# Patient Record
Sex: MALE | Race: WHITE
[De-identification: names, ages, dates, MRNs, and addresses within clinical notes are randomized per-mention and may not be internally consistent; named-entity substitution may affect disease eponyms.]

---

## 2017-02-21 ENCOUNTER — HOSPITAL ENCOUNTER (EMERGENCY)
Dept: HOSPITAL 45 - C.EDB | Age: 72
Discharge: HOME | End: 2017-02-21
Payer: COMMERCIAL

## 2017-02-21 VITALS — SYSTOLIC BLOOD PRESSURE: 149 MMHG | HEART RATE: 81 BPM | OXYGEN SATURATION: 96 % | DIASTOLIC BLOOD PRESSURE: 88 MMHG

## 2017-02-21 VITALS
HEIGHT: 72.01 IN | WEIGHT: 174.17 LBS | WEIGHT: 174.17 LBS | BODY MASS INDEX: 23.59 KG/M2 | BODY MASS INDEX: 23.59 KG/M2 | HEIGHT: 72.01 IN

## 2017-02-21 VITALS — TEMPERATURE: 97.88 F

## 2017-02-21 DIAGNOSIS — L03.313: ICD-10-CM

## 2017-02-21 DIAGNOSIS — I10: ICD-10-CM

## 2017-02-21 DIAGNOSIS — Z83.3: ICD-10-CM

## 2017-02-21 DIAGNOSIS — Z79.899: ICD-10-CM

## 2017-02-21 DIAGNOSIS — Z79.82: ICD-10-CM

## 2017-02-21 DIAGNOSIS — L02.213: Primary | ICD-10-CM

## 2017-02-21 DIAGNOSIS — E11.9: ICD-10-CM

## 2017-02-21 LAB
ANION GAP SERPL CALC-SCNC: 18 MMOL/L (ref 16–25)
CA-I BLD-SCNC: 1.19 MMOL/L (ref 1.12–1.32)
CHLORIDE BLD-SCNC: 104 MEQ/L (ref 101–112)
CREAT BLD-MCNC: 1.6 MG/DL (ref 0.6–1.3)
HCT VFR BLD AUTO: 36 % (ref 42–52)
HGB BLD-MCNC: 12.2 G/DL (ref 14–18)
ISTAT CARBON DIOXIDE: 24 MEQ/L (ref 24–31)
SODIUM BLD-SCNC: 140 MEQ/L (ref 135–144)

## 2017-02-21 NOTE — EMERGENCY ROOM VISIT NOTE
ED Visit Note


The patient has been thoroughly evaluated by Dr. Whitaker, who asked me to 

drain the potential abscess on the patient's anterior chest.  Please refer to 

his documentation regarding the patient's visit.  I examined the patient.  

Verbal consent was obtained to perform the procedure.  After saline and 

Betadine cleansing and 1.5 mL of 1% buffered lidocaine with epinephrine 

anesthesia, the abscess was incised with a number 11 scalpel blade.  A large 

amount of purulent material was released with more expressed by pressure.  A 

swab was obtained for culture.  The abscess cavity was further probed with 

blunt scissors and the deep pocket expressed.  The area was cleaned with 

sterile saline and dressed with a bulky bandage.  The patient tolerated the 

procedure well.

## 2017-02-21 NOTE — EMERGENCY ROOM VISIT NOTE
History


Report prepared by Lazaro:  Saleem Taylor


Under the Supervision of:  Dr. Parker Whitaker M.D.


First contact with patient:  16:15


Chief Complaint:  REFERRED BY DOCTOR


Stated Complaint:  HIGH POTASSIUM





History of Present Illness


The patient is a 71 year old male who presents to the Emergency Room after 

being referred for high potassium prior to arrival. The patient was getting 

blood-work for diabetes when his labs showed his potassium was 6. The patient 

denies any history of high potassium. He notes that right now he feels fine. 

The last time his sugar was checked, the patient notes that it was high. The 

patient also notes a bump on his chest that has some drainage. He had this area 

drained before, however, it came back. He denies nausea, vomiting, fevers, 

diarrhea, leg swelling, or any other medical complaints at this time.





   Source of History:  patient


   Onset:  prior to arrival


   Position:  other (global)


   Associated Symptoms:  No diarrhea, No fevers, No nausea, No vomiting


Note:


Associated symptoms: high sugar, bump with drainage on chest


Denies: leg swelling





Review of Systems


See HPI for pertinent positives & negatives. A total of 10 systems reviewed and 

were otherwise negative.





Past Medical & Surgical


Medical Problems:


(1) Benign hypertension


(2) Cellulitis of leg


(3) Diabetes mellitus type 2








Family History





Diabetes mellitus





Social History


Smoking Status:  Never Smoker


Alcohol Use:  none


Drug Use:  none


Marital Status:  


Housing Status:  lives alone


Occupation Status:  retired





Current/Historical Medications


Scheduled


Aspirin Enteric Coated (Ecotrin Or Generic), 81 MG PO DAILY


Atorvastatin (Lipitor), 10 MG PO DAILY


Cephalexin Monohydrate (Keflex), 500 MG PO TID


Gabapentin (Neurontin), 100 MG PO TID


Glipizide Xl (Glucotrol Xl), 10 MG PO QAM


Lisinopril (Prinivil), 20 MG PO DAILY


Sulfamethoxazole-Trimethoprim (Bactrim Ds 800MG/160MG), 1 TAB PO BID





Allergies


Coded Allergies:  


     No Known Allergies (Unverified , 2/21/17)





Physical Exam


Vital Signs











  Date Time  Temp Pulse Resp B/P Pulse Ox O2 Delivery O2 Flow Rate FiO2


 


2/21/17 18:50  81 18 149/88 96   


 


2/21/17 18:00  83 18 135/78 94 Room Air  


 


2/21/17 16:11 36.6 94 20 113/68 96   











Physical Exam


GENERAL: Patient is chronically unwell appearing in no distress. 


HEENT: No acute trauma, normocephalic atraumatic, mucous membranes moist, no 

nasal congestion. Cloudy right cornea, reactive left pupil. 


NECK: No stridor, no adenopathy, no meningismus, trachea is midline.


LUNGS: No dyspnea. Clear to auscultation and equal bilaterally. No wheeze, no 

rhonchi.


CHEST: 2 cm draining cyst in right mid chest with surrounding erythema. 


HEART: Regular rate and rhythm.  No murmurs, rubs, gallops appreciated.


ABDOMEN: Soft, nontender, bowel sounds positive, no masses appreciated, no 

peritonitis.


BACK: No midline tenderness, no CVA tenderness


EXTREMITIES: Normal motion all extremities, no cyanosis, no edema. Decreased 

sensation in feet, states chronic. 


NEUROLOGIC: Alert and oriented, no acute motor or sensory deficits, no focal 

weakness, cranial nerves grossly intact.


SKIN: No rash, no jaundice, no diaphoresis.





Medical Decision & Procedures


Laboratory Results











Test


  2/21/17


16:58


 


Bedside Hemoglobin


  12.2 g/dl


(14.0-18.0)


 


Bedside Hematocrit 36 % (42-52) 


 


Bedside Sodium


  140 mEq/L


(135-144)


 


Bedside Potassium


  5.3 mEq/L


(3.3-5.0)


 


Bedside Chloride


  104 mEq/L


(101-112)


 


Bedside Total CO2


  24 mEq/l


(24-31)


 


Anion Gap


  18.0 mmol/L


(16-25)


 


Bedside Blood Urea Nitrogen


  30 mg/dl


(7-18)


 


Bedside Creatinine


  1.6 mg/dl


(0.6-1.3)


 


Bedside Glucose (other)


  164 mg/dl


(70-99)


 


Bedside Ionized Calcium (Kina)


  1.19 mmol/l


(1.12-1.32)





Laboratory results as reviewed by me.





Medications Administered











 Medications


  (Trade)  Dose


 Ordered  Sig/Erika


 Route  Start Time


 Stop Time Status Last Admin


Dose Admin


 


 Cephalexin


 Monohydrate


  (Keflex Cap)  500 mg  NOW  ONCE


 PO  2/21/17 18:15


 2/21/17 18:16 DC 2/21/17 18:32


500 MG











ED Course


1620: The patient was evaluated in room C4. A complete history and physical 

exam was performed.





1630: Ordered Lidocaine/ Epinephrine 20 ml INFIL. 








1727: At this time, I reevaluated the patient and he feels good. He agrees to 

recheck his potassium levels with his PCP in a few days. He is waiting to get 

his chest cyst drained and then he is ready to go home. 





1802: At this time, Roddy Gill PA-C - [facility] performed an IND 

procedure on the patient. See his note for details. 





1815: Ordered Cephalexin Monohydrate 500 mg PO. 





1830: Reevaluated the patient. Discussed results and discharge instructions: He 

verbalized understanding and agreement.   The patient is ready for discharge.





Medical Decision


Differential: Sepsis, Infectious (UTI/Pneumonia/Meningitis/etc), Metabolic/

Electrolyte Abnormality, Cardiac, Hepatic, Endocrine, Toxicologic, Neurologic, 

amongst other pathologies entertained.





71 yr old male arrives for evaluation of elevated K at outpatient lab.  Bedside 

iStat has K of 5.3.  He has no symptoms hyperK and Cr OK.  I suspect that there 

is hemolysis going one.  Patient feels well and wishes to go home.  Seems 

reasonable to have recheck by PCP in next few days.  Did have abscess on chest 

which was drained and as there is mild cellulitis will start Keflex while 

awaiting culture.  No history of MRSA.  He is in no distress and feels well.  

Discussed symptoms requiring RTED.





Impression





 Primary Impression:  


 Routine lab draw


 Additional Impression:  


 Cutaneous abscess of chest wall





Scribe Attestation


The scribe's documentation has been prepared under my direction and personally 

reviewed by me in its entirety. I confirm that the note above accurately 

reflects all work, treatment, procedures, and medical decision making performed 

by me.





Departure Information


Dispostion


Home / Self-Care





Prescriptions





Cephalexin Monohydrate (Keflex) 500 Mg Cap


500 MG PO TID for 5 Days, #15 CAP


   Prov: Parker Whitaker M.D.         2/21/17





Referrals


Stephanie Covarrubias D.O. (PCP)





Forms


HOME CARE DOCUMENTATION FORM,                                                 

               IMPORTANT VISIT INFORMATION, WORK / SCHOOL INSTRUCTIONS





Patient Instructions


My [facility]





Additional Instructions





Please contact your Primary Provider to have a repeat Potassium check in the 

next few days.   You potassium was OK today but you must have it rechecked.





Return if chest pain, passing out, weakness or other concerns.


Monitor wound for worsening infection.  Return if fevers, redness, elevated 

blood sugars.





Problem Qualifiers

## 2017-09-11 ENCOUNTER — HOSPITAL ENCOUNTER (OUTPATIENT)
Dept: HOSPITAL 45 - X.SURG | Age: 72
Discharge: ANOTHER HOSPITAL | End: 2017-09-11
Attending: OPHTHALMOLOGY
Payer: COMMERCIAL

## 2017-09-11 ENCOUNTER — HOSPITAL ENCOUNTER (INPATIENT)
Dept: HOSPITAL 45 - C.EDC | Age: 72
LOS: 5 days | Discharge: NURSING, REHAB OR LONG-TERM CARE | DRG: 638 | End: 2017-09-16
Attending: HOSPITALIST | Admitting: HOSPITALIST
Payer: COMMERCIAL

## 2017-09-11 VITALS
HEART RATE: 99 BPM | TEMPERATURE: 98.06 F | OXYGEN SATURATION: 98 % | SYSTOLIC BLOOD PRESSURE: 91 MMHG | DIASTOLIC BLOOD PRESSURE: 57 MMHG

## 2017-09-11 VITALS
HEIGHT: 72.01 IN | WEIGHT: 170.35 LBS | WEIGHT: 170.35 LBS | BODY MASS INDEX: 23.07 KG/M2 | HEIGHT: 72.01 IN | BODY MASS INDEX: 23.07 KG/M2

## 2017-09-11 VITALS
OXYGEN SATURATION: 94 % | SYSTOLIC BLOOD PRESSURE: 113 MMHG | DIASTOLIC BLOOD PRESSURE: 73 MMHG | TEMPERATURE: 97.88 F | HEART RATE: 85 BPM

## 2017-09-11 VITALS
BODY MASS INDEX: 21.62 KG/M2 | HEIGHT: 72.01 IN | BODY MASS INDEX: 21.62 KG/M2 | WEIGHT: 159.61 LBS | WEIGHT: 159.61 LBS | BODY MASS INDEX: 21.62 KG/M2 | HEIGHT: 72.01 IN

## 2017-09-11 VITALS
HEART RATE: 82 BPM | SYSTOLIC BLOOD PRESSURE: 104 MMHG | DIASTOLIC BLOOD PRESSURE: 70 MMHG | TEMPERATURE: 97.88 F | OXYGEN SATURATION: 98 %

## 2017-09-11 DIAGNOSIS — E11.65: ICD-10-CM

## 2017-09-11 DIAGNOSIS — N40.1: ICD-10-CM

## 2017-09-11 DIAGNOSIS — R33.9: ICD-10-CM

## 2017-09-11 DIAGNOSIS — Z91.14: ICD-10-CM

## 2017-09-11 DIAGNOSIS — I10: ICD-10-CM

## 2017-09-11 DIAGNOSIS — E78.5: ICD-10-CM

## 2017-09-11 DIAGNOSIS — H18.891: Primary | ICD-10-CM

## 2017-09-11 DIAGNOSIS — H18.891: ICD-10-CM

## 2017-09-11 DIAGNOSIS — Z79.899: ICD-10-CM

## 2017-09-11 DIAGNOSIS — N18.3: ICD-10-CM

## 2017-09-11 DIAGNOSIS — D72.829: ICD-10-CM

## 2017-09-11 DIAGNOSIS — I95.1: ICD-10-CM

## 2017-09-11 DIAGNOSIS — Z83.3: ICD-10-CM

## 2017-09-11 DIAGNOSIS — R09.02: ICD-10-CM

## 2017-09-11 DIAGNOSIS — I12.9: ICD-10-CM

## 2017-09-11 DIAGNOSIS — D64.9: ICD-10-CM

## 2017-09-11 DIAGNOSIS — E11.65: Primary | ICD-10-CM

## 2017-09-11 DIAGNOSIS — N17.9: ICD-10-CM

## 2017-09-11 DIAGNOSIS — Z79.84: ICD-10-CM

## 2017-09-11 DIAGNOSIS — Z79.82: ICD-10-CM

## 2017-09-11 DIAGNOSIS — Z53.8: ICD-10-CM

## 2017-09-11 LAB
ALP SERPL-CCNC: 63 U/L (ref 45–117)
ALT SERPL-CCNC: 18 U/L (ref 12–78)
ANION GAP SERPL CALC-SCNC: 5 MMOL/L (ref 3–11)
ANION GAP SERPL CALC-SCNC: 7 MMOL/L (ref 3–11)
APPEARANCE UR: CLEAR
AST SERPL-CCNC: 9 U/L (ref 15–37)
B-OH-BUTYR SERPL-SCNC: 4.08 MG/DL (ref 0.2–2.81)
BASE EXCESS STD BLDV CALC-SCNC: 0.1 MEQ/L
BASOPHILS # BLD: 0.02 K/UL (ref 0–0.2)
BASOPHILS NFR BLD: 0.2 %
BILIRUB UR-MCNC: (no result) MG/DL
BUN SERPL-MCNC: 36 MG/DL (ref 7–18)
BUN SERPL-MCNC: 37 MG/DL (ref 7–18)
BUN/CREAT SERPL: 18.6 (ref 10–20)
BUN/CREAT SERPL: 20 (ref 10–20)
CALCIUM SERPL-MCNC: 8.8 MG/DL (ref 8.5–10.1)
CALCIUM SERPL-MCNC: 9.1 MG/DL (ref 8.5–10.1)
CHLORIDE SERPL-SCNC: 101 MMOL/L (ref 98–107)
CHLORIDE SERPL-SCNC: 108 MMOL/L (ref 98–107)
CO2 SERPL-SCNC: 25 MMOL/L (ref 21–32)
CO2 SERPL-SCNC: 28 MMOL/L (ref 21–32)
COLOR UR: YELLOW
COMPLETE: YES
CREAT CL PREDICTED SERPL C-G-VRATE: 35.5 ML/MIN
CREAT CL PREDICTED SERPL C-G-VRATE: 39.4 ML/MIN
CREAT SERPL-MCNC: 1.8 MG/DL (ref 0.6–1.4)
CREAT SERPL-MCNC: 2 MG/DL (ref 0.6–1.4)
EOSINOPHIL NFR BLD AUTO: 196 K/UL (ref 130–400)
GLUCOSE SERPL-MCNC: 117 MG/DL (ref 70–99)
GLUCOSE SERPL-MCNC: 542 MG/DL (ref 70–99)
HCT VFR BLD CALC: 33.1 % (ref 42–52)
IG%: 0.2 %
IMM GRANULOCYTES NFR BLD AUTO: 16.1 %
INR PPP: 0.9 (ref 0.9–1.1)
LYMPHOCYTES # BLD: 2.11 K/UL (ref 1.2–3.4)
MAGNESIUM SERPL-MCNC: 2.6 MG/DL (ref 1.8–2.4)
MAGNESIUM SERPL-MCNC: 2.6 MG/DL (ref 1.8–2.4)
MANUAL MICROSCOPIC REQUIRED?: NO
MCH RBC QN AUTO: 28.8 PG (ref 25–34)
MCHC RBC AUTO-ENTMCNC: 32.9 G/DL (ref 32–36)
MCV RBC AUTO: 87.3 FL (ref 80–100)
MONOCYTES NFR BLD: 4.7 %
NEUTROPHILS # BLD AUTO: 0.6 %
NEUTROPHILS NFR BLD AUTO: 78.2 %
NITRITE UR QL STRIP: (no result)
PARTIAL THROMBOPLASTIN RATIO: 0.9
PCO2 BLDV: 44 MMHG (ref 38–50)
PH UR STRIP: 5 [PH] (ref 4.5–7.5)
PHOSPHATE SERPL-MCNC: 3.2 MG/DL (ref 2.5–4.9)
PHOSPHATE SERPL-MCNC: 3.6 MG/DL (ref 2.5–4.9)
PMV BLD AUTO: 10.8 FL (ref 7.4–10.4)
PO2 BLDV: 32 MMHG
POTASSIUM SERPL-SCNC: 3.9 MMOL/L (ref 3.5–5.1)
POTASSIUM SERPL-SCNC: 5 MMOL/L (ref 3.5–5.1)
PROTHROMBIN TIME: 9.8 SECONDS (ref 9–12)
RBC # BLD AUTO: 3.79 M/UL (ref 4.7–6.1)
REVIEW REQ?: NO
SAO2 % BLDV FROM PO2: 60.5 %
SODIUM SERPL-SCNC: 133 MMOL/L (ref 136–145)
SODIUM SERPL-SCNC: 141 MMOL/L (ref 136–145)
SP GR UR STRIP: 1.03 (ref 1–1.03)
URINE BILL WITH OR WITHOUT MIC: (no result)
UROBILINOGEN UR-MCNC: (no result) MG/DL
WBC # BLD AUTO: 13.08 K/UL (ref 4.8–10.8)
ZZUR CULT IF INDIC CLEAN CATCH: NO

## 2017-09-11 RX ADMIN — SODIUM CHLORIDE SCH MLS/HR: 900 INJECTION, SOLUTION INTRAVENOUS at 19:34

## 2017-09-11 RX ADMIN — MOXIFLOXACIN HYDROCHLORIDE SCH DROP: 5 SOLUTION/ DROPS OPHTHALMIC at 11:11

## 2017-09-11 RX ADMIN — HEPARIN SODIUM SCH UNIT: 10000 INJECTION, SOLUTION INTRAVENOUS; SUBCUTANEOUS at 21:53

## 2017-09-11 RX ADMIN — MOXIFLOXACIN HYDROCHLORIDE SCH DROP: 5 SOLUTION/ DROPS OPHTHALMIC at 11:01

## 2017-09-11 RX ADMIN — MOXIFLOXACIN HYDROCHLORIDE SCH DROP: 5 SOLUTION/ DROPS OPHTHALMIC at 11:21

## 2017-09-11 RX ADMIN — INSULIN ASPART SCH UNITS: 100 INJECTION, SOLUTION INTRAVENOUS; SUBCUTANEOUS at 21:52

## 2017-09-11 NOTE — DIAGNOSTIC IMAGING REPORT
CHEST 2 VIEWS ROUTINE



CLINICAL HISTORY: cough, hypoxia dyspnea



COMPARISON STUDY:  10/1/2013



FINDINGS: Mild chronic interstitial prominence. No focal infiltrate. Diaphragms

smooth. Calcific angles are sharp. No evidence for cardiac enlargement. 



IMPRESSION:  Mild chronic change. No acute process. 











The above report was generated using voice recognition software.  It may contain

grammatical, syntax or spelling errors.









Electronically signed by:  John Mane M.D.

9/11/2017 4:54 PM



Dictated Date/Time:  9/11/2017 4:53 PM

## 2017-09-11 NOTE — EMERGENCY ROOM VISIT NOTE
History


Report prepared by Lazaro:  Yony Rangel


Under the Supervision of:  Dr. Brandon Reardon D.O.


First contact with patient:  12:35


Chief Complaint:  HYPERGLYCEMIA


Stated Complaint:  hyperglycemia


Nursing Triage Summary:  


pt to the ED from the surgery center by EMS, pt was to have a biopsy on the 


right eye today and prior to surgery they checked his BSG and it was high and 


586 so they sent him here





pt has no complaints and takes pills and stated that he took some pills this am 


but could not state what they were





 pt had LR hanging pta





History of Present Illness


The patient is a 72 year old male who presents to the Emergency Room with 

complaints of hyperglycemia that began recently. His blood sugar was 586 prior 

to arrival. The patient was supposed to have a biopsy of his right eye at the 

surgical center, when they noticed his blood sugar was elevated. They sent him 

here. He has been taking his blood sugar medication. His last bowel movement 

was 10 minutes ago and was normal. Pt denies headache, change in vision, fevers

, chest pain, shortness of breath, nausea, vomiting, diarrhea, pain with 

urination, and melena.





   Source of History:  patient


   Onset:  PTA


   Position:  other (global)


   Symptom Intensity:  Blood sugar of 586


   Quality:  other (Hyperglycemia)


   Timing:  constant


   Associated Symptoms:  No fevers, No headache, No chest pain, No SOB, No 

nausea, No vomiting, No melena, No diarrhea, No urinary symptoms


Note:


He has vision trouble in his right eye.





Review of Systems


See HPI for pertinent positives & negatives. A total of 10 systems reviewed and 

were otherwise negative.





Past Medical & Surgical


Medical Problems:


(1) DEANNE (acute kidney injury)


(2) Benign hypertension


(3) Cellulitis of leg


(4) CKD (chronic kidney disease), stage III


(5) Diabetes mellitus type 2


(6) GERD (gastroesophageal reflux disease)


(7) Hyperlipidemia


Surgical Problems:


(1) H/O skin graft


(2) S/P tonsillectomy








Family History





Diabetes mellitus





Social History


Smoking Status:  Never Smoker


Alcohol Use:  none


Drug Use:  none


Marital Status:  


Housing Status:  lives alone


Occupation Status:  retired





Current/Historical Medications


Scheduled


Aspirin Enteric Coated (Ecotrin Or Generic), 81 MG PO QAM


Atorvastatin (Lipitor), 10 MG PO DAILY


Gabapentin (Neurontin), 100 MG PO DAILY


Glipizide Xl (Glucotrol Xl), 10 MG PO QAM


Lisinopril (Zestril), 20 MG PO DAILY





Allergies


Coded Allergies:  


     No Known Allergies (Unverified , 9/11/17)





Physical Exam


Vital Signs











  Date Time  Temp Pulse Resp B/P (MAP) Pulse Ox O2 Delivery O2 Flow Rate FiO2


 


9/11/17 17:12  84 16 97/64 98   


 


9/11/17 16:11  89 18 134/83 98  2.0 


 


9/11/17 14:15  95 16 133/77 90   


 


9/11/17 12:38  81      


 


9/11/17 12:22 36.7 90 16 134/81 99   











Physical Exam


GENERAL: alert, sitting up in bed, chronically ill appearing, disheveled, well 

nourished, no distress, non-toxic 


EYE EXAM: Right pupil and cornea are opacified. 


OROPHARYNX: no exudate, no erythema, lips, buccal mucosa, and tongue normal and 

mucous membranes are moist


NECK: supple, no nuchal rigidity, no adenopathy, non-tender


LUNGS: Clear to auscultation. Normal chest wall mechanics


HEART: no murmurs, S1 normal and S2 normal 


ABDOMEN: abdomen soft, non-tender, normo-active bowel sounds, no masses, no 

rebound or guarding. 


BACK: Back is symmetrical on inspection and there is no deformity, no midline 

tenderness, no CVA tenderness. 


SKIN: no rashes and no bruising 


UPPER EXTREMITIES: upper extremities are grossly normal. 


LOWER EXTREMITIES: No pitting edema.


NEURO EXAM: Normal sensorium, cranial nerves II-XII grossly intact, normal 

speech,  no gross weakness of arms, no gross weakness of legs.





Medical Decision & Procedures


Laboratory Results


9/11/17 13:45








Red Blood Count 3.79, Mean Corpuscular Volume 87.3, Mean Corpuscular Hemoglobin 

28.8, Mean Corpuscular Hemoglobin Concent 32.9, Mean Platelet Volume 10.8, 

Neutrophils (%) (Auto) 78.2, Lymphocytes (%) (Auto) 16.1, Monocytes (%) (Auto) 

4.7, Eosinophils (%) (Auto) 0.6, Basophils (%) (Auto) 0.2, Neutrophils # (Auto) 

10.23, Lymphocytes # (Auto) 2.11, Monocytes # (Auto) 0.61, Eosinophils # (Auto) 

0.08, Basophils # (Auto) 0.02





9/11/17 13:53

















Test


  9/11/17


13:45 9/11/17


13:53 9/11/17


16:06 9/11/17


17:42


 


White Blood Count


  13.08 K/uL


(4.8-10.8) 


  


  


 


 


Red Blood Count


  3.79 M/uL


(4.7-6.1) 


  


  


 


 


Hemoglobin


  10.9 g/dL


(14.0-18.0) 


  


  


 


 


Hematocrit 33.1 % (42-52)    


 


Mean Corpuscular Volume


  87.3 fL


() 


  


  


 


 


Mean Corpuscular Hemoglobin


  28.8 pg


(25-34) 


  


  


 


 


Mean Corpuscular Hemoglobin


Concent 32.9 g/dl


(32-36) 


  


  


 


 


Platelet Count


  196 K/uL


(130-400) 


  


  


 


 


Mean Platelet Volume


  10.8 fL


(7.4-10.4) 


  


  


 


 


Neutrophils (%) (Auto) 78.2 %    


 


Lymphocytes (%) (Auto) 16.1 %    


 


Monocytes (%) (Auto) 4.7 %    


 


Eosinophils (%) (Auto) 0.6 %    


 


Basophils (%) (Auto) 0.2 %    


 


Neutrophils # (Auto)


  10.23 K/uL


(1.4-6.5) 


  


  


 


 


Lymphocytes # (Auto)


  2.11 K/uL


(1.2-3.4) 


  


  


 


 


Monocytes # (Auto)


  0.61 K/uL


(0.11-0.59) 


  


  


 


 


Eosinophils # (Auto)


  0.08 K/uL


(0-0.5) 


  


  


 


 


Basophils # (Auto)


  0.02 K/uL


(0-0.2) 


  


  


 


 


RDW Standard Deviation


  42.5 fL


(36.4-46.3) 


  


  


 


 


RDW Coefficient of Variation


  13.2 %


(11.5-14.5) 


  


  


 


 


Immature Granulocyte % (Auto) 0.2 %    


 


Immature Granulocyte # (Auto)


  0.03 K/uL


(0.00-0.02) 


  


  


 


 


Venous Blood pH


  


  7.38


(7.36-7.41) 


  


 


 


Venous Blood Partial Pressure


CO2 


  44 mmHg


(38.0-50.0) 


  


 


 


Venous Blood Partial Pressure


O2 


  32 mmHg 


  


  


 


 


Venous Blood HCO3  25 mmol/L   


 


Venous Blood Oxygen Saturation  60.5 %   


 


Venous Blood Base Excess  0.1 mEq/L   


 


Anion Gap


  


  7.0 mmol/L


(3-11) 


  


 


 


Est Creatinine Clear Calc


Drug Dose 


  35.5 ml/min 


  


  


 


 


Estimated GFR (


American) 


  37.8 


  


  


 


 


Estimated GFR (Non-


American 


  32.6 


  


  


 


 


BUN/Creatinine Ratio  18.6 (10-20)   


 


Calcium Level


  


  9.1 mg/dl


(8.5-10.1) 


  


 


 


Phosphorus Level


  


  3.6 mg/dl


(2.5-4.9) 


  


 


 


Magnesium Level


  


  2.6 mg/dl


(1.8-2.4) 


  


 


 


Total Bilirubin


  


  0.5 mg/dl


(0.2-1) 


  


 


 


Direct Bilirubin


  


  0.2 mg/dl


(0-0.2) 


  


 


 


Aspartate Amino Transf


(AST/SGOT) 


  9 U/L (15-37) 


  


  


 


 


Alanine Aminotransferase


(ALT/SGPT) 


  18 U/L (12-78) 


  


  


 


 


Alkaline Phosphatase


  


  63 U/L


() 


  


 


 


Total Protein


  


  7.0 gm/dl


(6.4-8.2) 


  


 


 


Albumin


  


  3.8 gm/dl


(3.4-5.0) 


  


 


 


Lipase


  


  198 U/L


() 


  


 


 


Beta-Hydroxybutyric Acid


  


  4.08 mg/dL


(0.2-2.81) 


  


 


 


Urine Color   YELLOW  


 


Urine Appearance   CLEAR (CLEAR)  


 


Urine pH   5.0 (4.5-7.5)  


 


Urine Specific Gravity


  


  


  1.034


(1.000-1.030) 


 


 


Urine Protein   NEG (NEG)  


 


Urine Glucose (UA)   3+ (NEG)  


 


Urine Ketones   NEG (NEG)  


 


Urine Occult Blood   NEG (NEG)  


 


Urine Nitrite   NEG (NEG)  


 


Urine Bilirubin   NEG (NEG)  


 


Urine Urobilinogen   NEG (NEG)  


 


Urine Leukocyte Esterase   NEG (NEG)  


 


Bedside Glucose


  


  


  


  224 mg/dl


(70-99)





Laboratory results per my review.





Medications Administered











 Medications


  (Trade)  Dose


 Ordered  Sig/Erika


 Route  Start Time


 Stop Time Status Last Admin


Dose Admin


 


 Sodium Chloride  1,000 ml @ 


 999 mls/hr  Q1H1M STAT


 IV  9/11/17 12:36


 9/11/17 13:36 DC 9/11/17 12:50


999 MLS/HR


 


 Insulin Human


 Regular


  (novoLIN-R U-100


 PER UNIT)  10 units  NOW  STAT


 SC  9/11/17 14:07


 9/11/17 14:08 DC 9/11/17 14:15


10 UNITS


 


 Insulin Human


 Regular 2 unit/


 Syringe  2 ml @ 1


 mls/min  TODAY@1630


 IV  9/11/17 16:30


 9/11/17 16:31 DC 9/11/17 17:26


1 MLS/MIN


 


 Insulin Human


 Regular 250 units/


 Sodium Chloride  252.5 ml @ 


 0 mls/hr  DAILY@1130


 IV  9/11/17 16:30


 10/11/17 16:29  9/11/17 17:24


1.8 MLS/HR











ECG


Indication:  other (Hyperglycemia)


Rate (beats per minute):  84


Rhythm:  sinus rhythm


Findings:  no ectopy, other (Normal axis)





ED Course


ED COURSE: 


Vital signs were reviewed and showed hypertension. 


The patients medical record was reviewed


The above diagnostic studies were performed and reviewed.


ED treatments and interventions as stated above. 





1235: The patient was evaluated in room C5. A complete history and physical 

examination was performed.





1236: Ordered Sodium Chloride 1000 ml @ 999 mls/hr IV





1406: I talked with the patient's family, and his ex-wife cannot take care of 

him at her home anymore. 





1407: Ordered Insulin Human Regular 10 units SC





1505: Upon reevaluation, the patient is resting. I discussed my findings with 

the patient and he understands and agrees with the treatment plan.   


Based on the patients age, coexisting illnesses, exam and lab findings the 

decision to treat as an inpatient was made. I spoke with Adrianne Ramos PA-C of 

[facility]. She will be evaluating the patient further.


The patient remained stable while under my care.


The patient will be evaluated for further management.





Medical Decision


Differential Diagnosis includes but is not limited to dehydration, stroke, 

anemia, hypoglycemia, hyponatremia, hypernatremia, urinary tract infection, 

pneumonia, bronchitis, sepsis, gastroenteritis, additional abdominal pathology, 

metabolic abnormalities and infections.   





Patient is a 72-year-old male that presents to ER from the surgery clinic for a 

BSG of 580.  He has been unable to take care of himself at home and constantly 

has moved in with his ex-wife in her current .  Ex-wife is unable to 

care for him any longer.  Patient is not taking his medications.  Patient 

denies any other complaints.  Labs show a mild leukocytosis.  No anion gap.  

Creatinine [place] Dulce Pineda of the baseline of 1.  BSG of 570.  PH was 

normal.  No ketones.  Patient was given 10 units of subcutaneous insulin.  He 

was given 1 L normal saline.  Discussed case with family him the patient and I 

feel he'll better served as an inpatient for his hyperglycemia and DEANNE.  He 

will likely need placement following admission.





Medication Reconcilliation


Current Medication List:  was personally reviewed by me





Blood Pressure Screening


Patient's blood pressure:  Elevated blood pressure


Blood pressure disposition:  Elevated BP felt to be situational





Consults


Time Called:  1500


Consulting Physician:  Adrianne Marley


Returned Call:  1505


I reviewed the patient's case with her.  She will evaluate the patient for 

further management.





Impression





 Primary Impression:  


 Hyperglycemia


 Additional Impression:  


 DEANNE (acute kidney injury)





Scribe Attestation


The scribe's documentation has been prepared under my direction and personally 

reviewed by me in its entirety. I confirm that the note above accurately 

reflects all work, treatment, procedures, and medical decision making performed 

by me.





Departure Information


Dispostion


Being Evaluated By Hospitalist





Referrals


Stephanie Covarrubias D.O. (PCP)





Patient Instructions


My [facility]





Problem Qualifiers

## 2017-09-11 NOTE — HISTORY & PHYSICAL BRIDGE - SC
H&P Re-Evaluation


Bridge Note:


Patient's blood glucose above 500 today.  Patient to ER for management.  

Patient will call to reschedule.

## 2017-09-11 NOTE — HISTORY AND PHYSICAL
History & Physical


Date & Time of Service:


Sep 11, 2017 at 16:19


Chief Complaint:


hyperglycemia


Primary Care Physician:


Stephanie Covarrubias D.O.


History of Present Illness


Source:  patient, clinic records


This is a 72 year old male with PMH of DM type 2, HTN, dyslipidemia, CKD stage 

III, and other problems listed below who was sent to the ED from [facility] for hyperglycemia. BSG was in 500s PTA at [facility], where he was 

supposed to have eye biopsy. Pt has not checked home BSG for several months. 

Patient was living alone, but has been staying with a friend for past 3 days. 

Has not taken glipizide since 3 days ago as he left it at home. Friend at 

bedside states he was eating (indiscretion with cookies, candy) at her home up 

until yesterday. Did not eat/ drink today due to being NPO for procedure. Pt 

reports nonproductive cough with nasal congestion and rhinorrhea x 1 week. Pt 

has been unable to void today- last voided yesterday evening. Ambulates with a 

cane. He fell out of bed overnight- denies any injury, head trauma, LOC. 

Chronic bilateral decreased vision is unchanged. Denies recent fever or chills. 

Denies headache, sinus pain, ear ache, sore throat, tooth ache, chest pain, SOB

, abdominal pain, N/V/D, dysuria, frequency, rash, open wound. The friend he is 

staying with has been ill with fever, vomiting, diarrhea. Pt's friend has 

concern about patient's ability to care for himself, and she is unable to care 

for him upon discharge. Patient's blood sugar was in 500s in ED. Received 10 

units of regular insulin SC and BSG 1 hour later was 490. Per ER RN, patient 

was placed on oxygen 2 liters NC while she was on break, then when she removed 

the O2, patient was hypoxic to high 80's. Now saturating well on 2 liters NC. 

No home O2 use. Denies hx of lung disease.





Past Medical/Surgical History


Medical Problems:


(1) Benign hypertension


Status: Chronic  





(2) Cellulitis of leg


Status: Resolved  





(3) CKD (chronic kidney disease), stage III


Status: Chronic  





(4) Diabetes mellitus type 2


Status: Chronic  





(5) GERD (gastroesophageal reflux disease)


Status: Chronic  





(6) Hyperlipidemia


Status: Chronic  





Surgical Problems:


(1) H/O skin graft


Status: Chronic  





(2) S/P tonsillectomy


Status: Chronic  

















Family History





Diabetes mellitus


  SISTER


Hypertension


  MOTHER





Social History


Smoking Status:  Never Smoker


Alcohol Use:  none (denies alcohol use. of note, alcohol abuse is listed in 

outpatient records. )


Drug Use:  none


Marital Status:  


Housing status:  lives alone (staying with friend past few days)


Occupational Status:  retired





Immunizations


History of Influenza Vaccine:  Yes


Influenza Vaccine Date:  Sep 19, 2012


History of Tetanus Vaccine?:  Unknown


History of Pneumococcal:  Yes


Pneumococcal Date:  Sep 30, 2012


History of Hepatitis B Vaccine:  No





Multi-Drug Resistant Organisms


History of MDRO:  No





Allergies


Coded Allergies:  


     No Known Allergies (Unverified , 9/11/17)





Home Medications


Scheduled


Aspirin Enteric Coated (Ecotrin Or Generic), 81 MG PO QAM


Atorvastatin (Lipitor), 10 MG PO DAILY


Gabapentin (Neurontin), 100 MG PO DAILY


Glipizide Xl (Glucotrol Xl), 10 MG PO QAM


Lisinopril (Zestril), 20 MG PO DAILY





Review of Systems


Ten systems reviewed and negative except as noted in HPI.





Physical Exam


Vital Signs











  Date Time  Temp Pulse Resp B/P (MAP) Pulse Ox O2 Delivery O2 Flow Rate FiO2


 


9/11/17 16:11  89 18 134/83 98  2.0 


 


9/11/17 14:15  95 16 133/77 90   


 


9/11/17 12:38  81      


 


9/11/17 12:22 36.7 90 16 134/81 99   








General Appearance:  WD/WN, no apparent distress


Head:  normocephalic, atraumatic


Eyes:  + pertinent finding (right cornea appears opaque, left pupil reactive to 

light)


ENT:  hearing grossly normal, TMs normal, pharynx normal, + pertinent finding (

almost edentulous, existing few teeth are in poor condition but no abscesses 

seen, no gum or tooth tenderness to palpation)


Neck:  supple, trachea midline


Respiratory/Chest:  lungs clear, normal breath sounds, no respiratory distress, 

no accessory muscle use, + pertinent finding (saturating well on 2 liters NC)


Cardiovascular:  regular rate, rhythm, no murmur


Abdomen/GI:  normal bowel sounds, non tender, soft


Extremities/Musculoskelatal:  no calf tenderness, no pedal edema, + pertinent 

finding (medial right chronic bony deformity. toenails are in poor repair, 

especially right 1st and 2nd digit. right 2nd toe mild erythema of entire toe, 

no swelling or tenderness.)


Neurologic/Psych:  alert, normal mood/affect, oriented x 3, + pertinent finding 

(sensation to light touch intact on bilateral feet)


Skin:  normal color, warm/dry





Diagnostics


Laboratory Results





Results Past 24 Hours








Test


  9/11/17


12:23 9/11/17


13:45 9/11/17


13:53 9/11/17


15:18 Range/Units


 


 


Bedside Glucose 576   491 70-99  mg/dl


 


White Blood Count  13.08   4.8-10.8  K/uL


 


Red Blood Count  3.79   4.7-6.1  M/uL


 


Hemoglobin  10.9   14.0-18.0  g/dL


 


Hematocrit  33.1   42-52  %


 


Mean Corpuscular Volume  87.3     fL


 


Mean Corpuscular Hemoglobin  28.8   25-34  pg


 


Mean Corpuscular Hemoglobin


Concent 


  32.9


  


  


  32-36  g/dl


 


 


Platelet Count  196   130-400  K/uL


 


Mean Platelet Volume  10.8   7.4-10.4  fL


 


Neutrophils (%) (Auto)  78.2    %


 


Lymphocytes (%) (Auto)  16.1    %


 


Monocytes (%) (Auto)  4.7    %


 


Eosinophils (%) (Auto)  0.6    %


 


Basophils (%) (Auto)  0.2    %


 


Neutrophils # (Auto)  10.23   1.4-6.5  K/uL


 


Lymphocytes # (Auto)  2.11   1.2-3.4  K/uL


 


Monocytes # (Auto)  0.61   0.11-0.59  K/uL


 


Eosinophils # (Auto)  0.08   0-0.5  K/uL


 


Basophils # (Auto)  0.02   0-0.2  K/uL


 


RDW Standard Deviation  42.5   36.4-46.3  fL


 


RDW Coefficient of Variation  13.2   11.5-14.5  %


 


Immature Granulocyte % (Auto)  0.2    %


 


Immature Granulocyte # (Auto)  0.03   0.00-0.02  K/uL


 


Venous Blood pH   7.38  7.36-7.41  


 


Venous Blood Partial Pressure


CO2 


  


  44


  


  38.0-50.0  mmHg


 


 


Venous Blood Partial Pressure


O2 


  


  32


  


   mmHg


 


 


Venous Blood HCO3   25   mmol/L


 


Venous Blood Oxygen Saturation   60.5   %


 


Venous Blood Base Excess   0.1   mEq/L


 


Sodium Level   133  136-145  mmol/L


 


Potassium Level   5.0  3.5-5.1  mmol/L


 


Chloride Level   101    mmol/L


 


Carbon Dioxide Level   25  21-32  mmol/L


 


Anion Gap   7.0  3-11  mmol/L


 


Blood Urea Nitrogen   37  7-18  mg/dl


 


Creatinine


  


  


  2.00


  


  0.60-1.40


mg/dl


 


Est Creatinine Clear Calc


Drug Dose 


  


  35.5


  


   ml/min


 


 


Estimated GFR (


American) 


  


  37.8


  


   


 


 


Estimated GFR (Non-


American 


  


  32.6


  


   


 


 


BUN/Creatinine Ratio   18.6  10-20  


 


Random Glucose   542  70-99  mg/dl


 


Calcium Level   9.1  8.5-10.1  mg/dl


 


Total Bilirubin   0.5  0.2-1  mg/dl


 


Direct Bilirubin   0.2  0-0.2  mg/dl


 


Aspartate Amino Transf


(AST/SGOT) 


  


  9


  


  15-37  U/L


 


 


Alanine Aminotransferase


(ALT/SGPT) 


  


  18


  


  12-78  U/L


 


 


Alkaline Phosphatase   63    U/L


 


Total Protein   7.0  6.4-8.2  gm/dl


 


Albumin   3.8  3.4-5.0  gm/dl


 


Lipase   198    U/L


 


Beta-Hydroxybutyric Acid   4.08  0.2-2.81  mg/dL


 


Test


  9/11/17


16:04 9/11/17


16:06 


  


  Range/Units


 











EKG


NSR, 84 bpm, no ST or T wave abnormality





Impression


Assessment and Plan





HYPERGLYCEMIA


In setting of DM type 2, uncontrolled per last A1c (9.6 in 2/2017)


Likely secondary to medication non-compliance, dietary indiscretion, rule out 

infection- afebrile, +mild leukocytosis (WBC 13K) f/u CXR and UA


AG normal, not in DKA


Hold glipizide


Start insulin drip and monitor electrolytes


Consult pharmacy for glycemic control 


Check A1c


Diabetes educator consult, also recommend podiatry referral as outpatient





HYPOXIA


Was placed on O2 nasal cannula in ER, was hypoxic to high 80's upon removal by 

ER RN


Not on home O2- check CXR to r/o PNA given leukocytosis and c/o cough


Continue supplemental O2 per protocol 





DEANNE ON CKD STAGE III


Creat is 2.0, baseline 1.2


Possibly secondary to dehydration; denies NSAID use


Hold lisinopril


Received 1 liter IVF's in ER- continue at 125 cc/hour


Monitor renal function


Avoid nephrotoxins when able 





URINARY RETENTION


Unable to void today, bladder scan + urinary retention in ER- 1450 mL output on 

straight cath 


UA pending


Bladder scan and straight cath PRN





CHRONIC ANEMIA


Hg is 10.9, was 11's back in 2013


Follow up as outpatient





DYSLIPIDEMIA


Continue statin





DVT PROPHYLAXIS


Heparin SQ





CODE STATUS


Full code per my discussion with the patient





DISPOSITION


Admission telemetry


May need placement, lives alone, friend concerned he is unable to care for 

himself at home, and she is unable to care for him after discharge


PT, OT, social service evaluation


Follows with Dr. Stephanie Covarrubias for primary care





Patient seen in collaboration with Dr. Pradhan. Please see his addendum.





VTE Prophylaxis


VTE Risk Assessment Done? Y/N:  Yes


Risk Level:  Moderate





Note








ATTENDING ADDENDUM





Record reviewed.


Patient interviewed and examined in ED.


Care coordinated with Madina Ramos PA-C.


Please refer to her documentation for patient's history.


Briefly, 71 YO male with history of DM type 2, managed with glipizide.


Has not take his glipizide for a few days.


Had outpatient eye surgery planned for today; found to have blood sugar > 500 

and was referred to ED.


Pt notes blurred vision. Denies polyuria or polydipsia.





EXAM:





General-  appears to be chronically ill, no acute distress


VS- as noted


HEENT-  anicteric


Neck-  no JVD


Lungs-  clear


Heart-  RRR, II/VI sys murmur at base


Abdomen-  + BS, soft, nontender, no masses  or hepatosplenomegaly


Rectal- prostate enlarged, nontender; no masses appreciated


Extremities-  no pretibial edema or calf tenderness; pedal pulses intact; no 

diabetic foot lesions


Neuro-  alert








DATA:





Random blood sugar 576.


BUN 37, creat 2.0. Na 133. K 530. AG 7.0. BHG 4.08


UA 3+ glucose, otherwise negative.


Other lab studies as noted.





CXR reviewed- emphysematous changes, no infiltrates, effusions, CHF.





EKG performed at 12:22 reviewed and demonstrated NSR at 84 / minute, no acute 

ST or T-wave changes..








ASSESSMENT AND PLAN:





DM TYPE 2, POORLY CONTROLLED


Sounds like chronic problems with glycemic management.


Has not taken glipizide for past few days.


Has tried metformin in the past, but apparently unable to take it.


Not a candidate for insulin therapy.


Started on insulin infusion in ED.


Check Hgb A1C.


Pharmacy consulted for glycemic management.





ACUTE KIDNEY INJURY


Serum creatinine 2, compared to 1.26 in 2015.


Acute kidney injury could be secondary to hyperglycemia or obstructive uropathy.


Hold lisinopril, then restart at reduced dose with caution if creatinine 

improves.


Check renal US.





URINARY RETENTION


Patient reports that he typically only voids 1-3 times a day.


Bladder scan in ED revealed bladder volume > 999 ml.


Straight cath --> 1400 ml.


Denies urgency, frequency, nocturia, dysuria, hematuria.


Rectal exam reveals enlarged prostate.


Straight cath PRN.


Start tamsulosin if BP's OK.


Consult Urology.





Please refer to MATT Ramos's documentation for discussion of other issues.





Dony Pradhan MD


.

## 2017-09-11 NOTE — ANESTHESIOLOGY PROGRESS NOTE
Anesthesia Progress Note


Date of Service


Sep 11, 2017.





Progress Notes


Pt was scheduled for right eye corneal biopsy. Upon admission, the pt's blood 

sugar was 586. The pt and his caretaker had conflicting reports as to whether 

he took his glipizide this morning. He does not routinely check his blood 

sugars. Vital signs were BP 91/57, P 99, R 18, and O2 sat 98% on RA. The pt 

denied any symptoms that included CP, SOB, or dizziness. I spoke with the pt, 

his caretaker, and Dr. Gleason that the procedure would need to be rescheduled, 

and he would need to go to the [facility] emergency room for further care. All 

parties were understanding. I spoke with the ER charge nurse, and I gave report 

of the pt's status. The pt will be taken to the ER by ambulance transport.

## 2017-09-12 VITALS
HEART RATE: 86 BPM | TEMPERATURE: 98.6 F | DIASTOLIC BLOOD PRESSURE: 61 MMHG | OXYGEN SATURATION: 98 % | SYSTOLIC BLOOD PRESSURE: 118 MMHG

## 2017-09-12 VITALS
DIASTOLIC BLOOD PRESSURE: 70 MMHG | OXYGEN SATURATION: 99 % | TEMPERATURE: 98.96 F | HEART RATE: 84 BPM | SYSTOLIC BLOOD PRESSURE: 123 MMHG

## 2017-09-12 VITALS
TEMPERATURE: 97.88 F | OXYGEN SATURATION: 99 % | SYSTOLIC BLOOD PRESSURE: 116 MMHG | DIASTOLIC BLOOD PRESSURE: 76 MMHG | HEART RATE: 88 BPM

## 2017-09-12 VITALS
OXYGEN SATURATION: 96 % | HEART RATE: 88 BPM | TEMPERATURE: 98.6 F | DIASTOLIC BLOOD PRESSURE: 75 MMHG | SYSTOLIC BLOOD PRESSURE: 132 MMHG

## 2017-09-12 VITALS
OXYGEN SATURATION: 98 % | TEMPERATURE: 97.7 F | DIASTOLIC BLOOD PRESSURE: 88 MMHG | SYSTOLIC BLOOD PRESSURE: 144 MMHG | HEART RATE: 88 BPM

## 2017-09-12 VITALS
OXYGEN SATURATION: 99 % | TEMPERATURE: 98.96 F | SYSTOLIC BLOOD PRESSURE: 144 MMHG | DIASTOLIC BLOOD PRESSURE: 73 MMHG | HEART RATE: 90 BPM

## 2017-09-12 VITALS — OXYGEN SATURATION: 97 % | HEART RATE: 105 BPM

## 2017-09-12 LAB
ANION GAP SERPL CALC-SCNC: 5 MMOL/L (ref 3–11)
APPEARANCE UR: CLEAR
BILIRUB UR-MCNC: (no result) MG/DL
BUN SERPL-MCNC: 32 MG/DL (ref 7–18)
BUN/CREAT SERPL: 22.8 (ref 10–20)
CALCIUM SERPL-MCNC: 8 MG/DL (ref 8.5–10.1)
CHLORIDE SERPL-SCNC: 109 MMOL/L (ref 98–107)
CO2 SERPL-SCNC: 27 MMOL/L (ref 21–32)
COLOR UR: YELLOW
CREAT CL PREDICTED SERPL C-G-VRATE: 50.7 ML/MIN
CREAT SERPL-MCNC: 1.4 MG/DL (ref 0.6–1.4)
EST. AVERAGE GLUCOSE BLD GHB EST-MCNC: 384 MG/DL
GLUCOSE SERPL-MCNC: 211 MG/DL (ref 70–99)
MAGNESIUM SERPL-MCNC: 2.5 MG/DL (ref 1.8–2.4)
MANUAL MICROSCOPIC REQUIRED?: NO
NITRITE UR QL STRIP: (no result)
PH UR STRIP: 5.5 [PH] (ref 4.5–7.5)
PHOSPHATE SERPL-MCNC: 2.4 MG/DL (ref 2.5–4.9)
POTASSIUM SERPL-SCNC: 4.8 MMOL/L (ref 3.5–5.1)
REVIEW REQ?: NO
SODIUM SERPL-SCNC: 141 MMOL/L (ref 136–145)
SP GR UR STRIP: 1.02 (ref 1–1.03)
URINE EPITHELIAL CELL AUTO: (no result) /LPF (ref 0–5)
UROBILINOGEN UR-MCNC: (no result) MG/DL
ZZUR CULT IF INDIC CLEAN CATCH: NO

## 2017-09-12 RX ADMIN — HEPARIN SODIUM SCH UNIT: 10000 INJECTION, SOLUTION INTRAVENOUS; SUBCUTANEOUS at 20:33

## 2017-09-12 RX ADMIN — SODIUM CHLORIDE SCH MLS/HR: 900 INJECTION, SOLUTION INTRAVENOUS at 02:11

## 2017-09-12 RX ADMIN — HEPARIN SODIUM SCH UNIT: 10000 INJECTION, SOLUTION INTRAVENOUS; SUBCUTANEOUS at 14:43

## 2017-09-12 RX ADMIN — HEPARIN SODIUM SCH UNIT: 10000 INJECTION, SOLUTION INTRAVENOUS; SUBCUTANEOUS at 06:00

## 2017-09-12 RX ADMIN — Medication SCH MG: at 07:57

## 2017-09-12 RX ADMIN — ATORVASTATIN CALCIUM SCH MG: 10 TABLET, FILM COATED ORAL at 07:57

## 2017-09-12 RX ADMIN — INSULIN ASPART SCH UNITS: 100 INJECTION, SOLUTION INTRAVENOUS; SUBCUTANEOUS at 20:29

## 2017-09-12 RX ADMIN — INSULIN GLARGINE SCH UNITS: 100 INJECTION, SOLUTION SUBCUTANEOUS at 20:32

## 2017-09-12 RX ADMIN — GABAPENTIN SCH MG: 100 CAPSULE ORAL at 07:57

## 2017-09-12 RX ADMIN — INSULIN ASPART SCH UNITS: 100 INJECTION, SOLUTION INTRAVENOUS; SUBCUTANEOUS at 12:03

## 2017-09-12 RX ADMIN — INSULIN ASPART SCH UNITS: 100 INJECTION, SOLUTION INTRAVENOUS; SUBCUTANEOUS at 17:36

## 2017-09-12 RX ADMIN — INSULIN ASPART SCH UNITS: 100 INJECTION, SOLUTION INTRAVENOUS; SUBCUTANEOUS at 08:03

## 2017-09-12 NOTE — DIAGNOSTIC IMAGING REPORT
(RENAL)RETROPERITON COMP



HISTORY: Renal insufficiency urinary retention, acute kidney injury



COMPARISON:  None.



FINDINGS:



Right kidney: Maximum dimension 10.1 cm. No evidence for hydronephrosis. Normal

corticomedullary differentiation and cortical thickness.



Left kidney:  Maximum dimension 9.1 cm. No evidence for hydronephrosis. Normal

corticomedullary differentiation and cortical thickness.



Bladder: No bladder wall thickening. The bilateral ureteral jets were

identified.



IMPRESSION:  

Normal renal ultrasound. 









The above report was generated using voice recognition software.  It may contain

grammatical, syntax or spelling errors.







Electronically signed by:  John Mane M.D.

9/12/2017 8:41 AM



Dictated Date/Time:  9/12/2017 8:40 AM

## 2017-09-12 NOTE — PROGRESS NOTE
Internal Med Progress Note


Date of Service:


Sep 12, 2017.


Provider Documentation:





SUBJECTIVE:





The patient was seen and examined 


Can not pass any urine 


Abdominal discomfort 


No nausea and or vomiting








OBJECTIVE:





Vital Signs-as noted below





Exam:


General-NO distress at rest


Eyes-normal


ENT-normal


Neck-supple


Lungs-clear to auscultate bilaterally 


Heart-Regular,no murmur 


Abdomen-Benign,no masses,bowel sound present 


Extremities-Np edema 


Neuro-AAOx3





Lab data as noted below.


ASSESSMENT & PLAN:





Uncontrolled Diabetes


Potential to get worse with DKA/Hyperosmolar state 


Last HbA1c A1c (9.6 in 2/2017)


Likely secondary to medication non-compliance, dietary indiscretion, rule out 

infection- afebrile, +mild leukocytosis (WBC 13K) f/u CXR and UA


Started on  insulin drip and monitor electrolytes


Consult pharmacy for glycemic control 


Check [id number] at 15


Diabetes educator consult, also recommend podiatry referral as outpatient





HYPOXIA-without any definite cause 


Was placed on O2 nasal cannula in ER, was hypoxic to high 80's upon removal by 

ER RN


Not on home O2- check CXR to r/o PNA given leukocytosis and c/o cough


Continue supplemental O2 per protocol 





DEANNE ON CKD STAGE III


Creat is 2.0, baseline 1.2


Possibly secondary to dehydration; 


No h/o NSAID use 


Hold lisinopril


Received 1 liter IVF's in ER- continue at 125 cc/hour


Avoid nephrotoxins when able 


Creatinine improvinh





URINARY RETENTION


Unable to void today, bladder scan + urinary retention in ER- 1450 mL output on 

straight cath 


UA pending-negative


Likely secondary to BPH 


Bladder scan and straight cath PRN


Urology consulted





CHRONIC ANEMIA


Hg is 10.9, was 11's back in 2013


Follow up as outpatient





DYSLIPIDEMIA


Continue statin





DVT PROPHYLAXIS


Heparin SQ





CODE STATUS


Full code per my discussion with the patient





DISPOSITION


Admission telemetry


May need placement, lives alone, friend concerned he is unable to care for 

himself at home, and she is unable to care for him after discharge


PT, OT, social service evaluation


Follows with Dr. Stephanie Covarrubias for primary care


Vital Signs:











  Date Time  Temp Pulse Resp B/P (MAP) Pulse Ox O2 Delivery O2 Flow Rate FiO2


 


9/12/17 16:00      Nasal Cannula 2.0 


 


9/12/17 15:18 36.6 88 18 116/76 (89) 99 Nasal Cannula 2.0 


 


9/12/17 12:00      Nasal Cannula 2.0 


 


9/12/17 10:59 37.2 84 20 123/70 (87) 99  2.0 


 


9/12/17 09:01  105   97   


 


9/12/17 08:00      Nasal Cannula 2.0 


 


9/12/17 07:20 37.0 88 20 132/75 (94) 96  2.0 


 


9/12/17 04:17 36.5 88 16 144/88 (106) 98   


 


9/12/17 04:00      Nasal Cannula 2.0 


 


9/12/17 00:00      Nasal Cannula 2.0 


 


9/11/17 23:52 36.6 85 16 113/73 (86) 94 Room Air  


 


9/11/17 22:02 36.6 82 18 104/70 98 Nasal Cannula 2.0 








Lab Results:





Results Past 24 Hours








Test


  9/11/17


18:41 9/11/17


18:49 9/11/17


18:56 9/11/17


19:08 Range/Units


 


 


Bedside Glucose 119  108 81 70-99  mg/dl


 


Prothrombin Time


  


  9.8


  


  


  9.0-12.0


SECONDS


 


Prothromb Time International


Ratio 


  0.9


  


  


  0.9-1.1  


 


 


Activated Partial


Thromboplast Time 


  24.3


  


  


  21.0-31.0


SECONDS


 


Partial Thromboplastin Ratio  0.9    


 


Test


  9/11/17


19:25 9/11/17


19:43 9/11/17


20:03 9/11/17


20:13 Range/Units


 


 


Bedside Glucose 68 72  192 70-99  mg/dl


 


Venous Blood pH   7.31  7.36-7.41  


 


Sodium Level   141  136-145  mmol/L


 


Potassium Level   3.9  3.5-5.1  mmol/L


 


Chloride Level   108    mmol/L


 


Carbon Dioxide Level   28  21-32  mmol/L


 


Anion Gap   5.0  3-11  mmol/L


 


Blood Urea Nitrogen   36  7-18  mg/dl


 


Creatinine


  


  


  1.80


  


  0.60-1.40


mg/dl


 


Est Creatinine Clear Calc


Drug Dose 


  


  39.4


  


   ml/min


 


 


Estimated GFR (


American) 


  


  42.9


  


   


 


 


Estimated GFR (Non-


American 


  


  37.0


  


   


 


 


BUN/Creatinine Ratio   20.0  10-20  


 


Random Glucose   117  70-99  mg/dl


 


Calcium Level   8.8  8.5-10.1  mg/dl


 


Phosphorus Level   3.2  2.5-4.9  mg/dl


 


Magnesium Level   2.6  1.8-2.4  mg/dl


 


Test


  9/11/17


21:43 9/12/17


01:50 9/12/17


05:37 9/12/17


07:32 Range/Units


 


 


Bedside Glucose 210 230  209 70-99  mg/dl


 


Sodium Level   141  136-145  mmol/L


 


Potassium Level   4.8  3.5-5.1  mmol/L


 


Chloride Level   109    mmol/L


 


Carbon Dioxide Level   27  21-32  mmol/L


 


Anion Gap   5.0  3-11  mmol/L


 


Blood Urea Nitrogen   32  7-18  mg/dl


 


Creatinine


  


  


  1.40


  


  0.60-1.40


mg/dl


 


Est Creatinine Clear Calc


Drug Dose 


  


  50.7


  


   ml/min


 


 


Estimated GFR (


American) 


  


  58.2


  


   


 


 


Estimated GFR (Non-


American 


  


  50.2


  


   


 


 


BUN/Creatinine Ratio   22.8  10-20  


 


Random Glucose   211  70-99  mg/dl


 


Estimated Average Glucose   384   mg/dl


 


Hemoglobin A1c   15.0  4.5-5.6  %


 


Calcium Level   8.0  8.5-10.1  mg/dl


 


Phosphorus Level   2.4  2.5-4.9  mg/dl


 


Magnesium Level   2.5  1.8-2.4  mg/dl


 


Test


  9/12/17


11:22 9/12/17


14:30 9/12/17


16:10 


  Range/Units


 


 


Bedside Glucose 285  134  70-99  mg/dl


 


Urine Color  YELLOW    


 


Urine Appearance  CLEAR   CLEAR  


 


Urine pH  5.5   4.5-7.5  


 


Urine Specific Gravity  1.023   1.000-1.030  


 


Urine Protein  NEG   NEG  


 


Urine Glucose (UA)  3+   NEG  


 


Urine Ketones  TRACE   NEG  


 


Urine Occult Blood  1+   NEG  


 


Urine Nitrite  NEG   NEG  


 


Urine Bilirubin  NEG   NEG  


 


Urine Urobilinogen  NEG   NEG  


 


Urine Leukocyte Esterase  NEG   NEG  


 


Urine WBC (Auto)  0   0-5  /hpf


 


Urine RBC (Auto)  0-4   0-4  /hpf


 


Urine Hyaline Casts (Auto)  0   0-5  /lpf


 


Urine Epithelial Cells (Auto)  0-5   0-5  /lpf


 


Urine Bacteria (Auto)  NEG   NEG

## 2017-09-12 NOTE — UROLOGY CONSULTATION
History


General


Date of Service:


Sep 12, 2017.


Chief Complaint:  urinary retention


Primary Care Physician:


Stephanie Covarrubias D.O.


Pt seen a urologist before?:  No





History of Present Illness


I am asked by Dr Pradhan to evaluate and treat patient for urinary retention.  

he is admitted with hyperglycemia.  he has been non compliant with his meds and 

his housing situation has been challenging of late.  He says he has no voiding 

problems at all and all he trouble started once he got to the hospital.  He had 

retention of over a liter and had no sensation of fullness.  he does admit to 

some nocturia at home.  


He was on a intermittent cath routine here in hospital and nurses say each 

catheterization has been difficult.  It has take 2-3 tries each time.  They 

placed indwelling wade this afternoon.  The resistance to catheter passage has 

been proximal penile to bulbar urethra.  He has had some urethral bleeding 

after catheter passage.  Patient tells me he is going home to his apartment 

tomorrow.





Imaging


Imaging:  Ultrasound





Laboratory





Results Past 24 Hours








Test


  9/11/17


18:56 9/11/17


19:08 9/11/17


19:25 9/11/17


19:43 Range/Units


 


 


Bedside Glucose 108 81 68 72 70-99  mg/dl


 


Test


  9/11/17


20:03 9/11/17


20:13 9/11/17


21:43 9/12/17


01:50 Range/Units


 


 


Venous Blood pH 7.31    7.36-7.41  


 


Sodium Level 141    136-145  mmol/L


 


Potassium Level 3.9    3.5-5.1  mmol/L


 


Chloride Level 108      mmol/L


 


Carbon Dioxide Level 28    21-32  mmol/L


 


Anion Gap 5.0    3-11  mmol/L


 


Blood Urea Nitrogen 36    7-18  mg/dl


 


Creatinine


  1.80


  


  


  


  0.60-1.40


mg/dl


 


Est Creatinine Clear Calc


Drug Dose 39.4


  


  


  


   ml/min


 


 


Estimated GFR (


American) 42.9


  


  


  


   


 


 


Estimated GFR (Non-


American 37.0


  


  


  


   


 


 


BUN/Creatinine Ratio 20.0    10-20  


 


Random Glucose 117    70-99  mg/dl


 


Calcium Level 8.8    8.5-10.1  mg/dl


 


Phosphorus Level 3.2    2.5-4.9  mg/dl


 


Magnesium Level 2.6    1.8-2.4  mg/dl


 


Bedside Glucose  192 210 230 70-99  mg/dl


 


Test


  9/12/17


05:37 9/12/17


07:32 9/12/17


11:22 9/12/17


14:30 Range/Units


 


 


Sodium Level 141    136-145  mmol/L


 


Potassium Level 4.8    3.5-5.1  mmol/L


 


Chloride Level 109      mmol/L


 


Carbon Dioxide Level 27    21-32  mmol/L


 


Anion Gap 5.0    3-11  mmol/L


 


Blood Urea Nitrogen 32    7-18  mg/dl


 


Creatinine


  1.40


  


  


  


  0.60-1.40


mg/dl


 


Est Creatinine Clear Calc


Drug Dose 50.7


  


  


  


   ml/min


 


 


Estimated GFR (


American) 58.2


  


  


  


   


 


 


Estimated GFR (Non-


American 50.2


  


  


  


   


 


 


BUN/Creatinine Ratio 22.8    10-20  


 


Random Glucose 211    70-99  mg/dl


 


Estimated Average Glucose 384     mg/dl


 


Hemoglobin A1c 15.0    4.5-5.6  %


 


Calcium Level 8.0    8.5-10.1  mg/dl


 


Phosphorus Level 2.4    2.5-4.9  mg/dl


 


Magnesium Level 2.5    1.8-2.4  mg/dl


 


Bedside Glucose  209 285  70-99  mg/dl


 


Urine Color    YELLOW  


 


Urine Appearance    CLEAR CLEAR  


 


Urine pH    5.5 4.5-7.5  


 


Urine Specific Gravity    1.023 1.000-1.030  


 


Urine Protein    NEG NEG  


 


Urine Glucose (UA)    3+ NEG  


 


Urine Ketones    TRACE NEG  


 


Urine Occult Blood    1+ NEG  


 


Urine Nitrite    NEG NEG  


 


Urine Bilirubin    NEG NEG  


 


Urine Urobilinogen    NEG NEG  


 


Urine Leukocyte Esterase    NEG NEG  


 


Urine WBC (Auto)    0 0-5  /hpf


 


Urine RBC (Auto)    0-4 0-4  /hpf


 


Urine Hyaline Casts (Auto)    0 0-5  /lpf


 


Urine Epithelial Cells (Auto)    0-5 0-5  /lpf


 


Urine Bacteria (Auto)    NEG NEG  


 


Test


  9/12/17


16:10 


  


  


  Range/Units


 


 


Bedside Glucose 134    70-99  mg/dl





Labs were reviewed and are within normal limits unless listed below. Labs are 

available in the chart and at [facility]





Problem List


Medical Problems:


(1) Cutaneous abscess of chest wall


Status: Acute  





(2) Hyperglycemia


Status: Acute  





(3) Routine lab draw


Status: Acute  











Past History


diabetes, hypertension, other (eye disease)


Past Surgical History:  no surgical history





Family History





Diabetes mellitus


  SISTER


Hypertension


  MOTHER





Social History


Hx Tobacco Use In Past Year?:  No (QUIT 30 YEARS AGO)


Alcohol:  socially


Marital status:  


Housing status:  lives alone (staying with friend past few days)


Occupation status:  retired





Immunizations


History of Influenza Vaccine:  Yes


Influenza Vaccine Date:  Sep 19, 2012


History of Tetanus Vaccine?:  Unknown


History of Pneumococcal:  Yes


Pneumococcal Date:  Sep 30, 2012


History of Hepatitis B Vaccine:  No





History of MDRO


No





Allergies


Coded Allergies:  


     No Known Allergies (Unverified , 9/11/17)





Medications


Home Medications:





Home Meds and Scripts








 Medications  Dose


 Route/Sig


 Max Daily Dose Days Date Category


 


 Zestril


  (Lisinopril) 20


 Mg Tab  20 Mg


 PO DAILY


    9/11/17 Reported


 


 Neurontin


  (Gabapentin) 100


 Mg Cap  100 Mg


 PO DAILY


    2/21/17 Reported


 


 Glucotrol Xl


  (Glipizide) 10 Mg


 Tab  10 Mg


 PO QAM


    4/16/15 Reported


 


 Lipitor


  (Atorvastatin


 Calcium) 10 Mg Tab  10 Mg


 PO DAILY


    2/5/14 Reported


 


 Ecotrin Or


 Generic (Aspirin)


 81 Mg Tab  81 Mg


 PO QAM


    9/29/12 Reported








Inpatient Medications:





Current Inpatient Medications








 Medications


  (Trade)  Dose


 Ordered  Sig/Erika


 Route  Start Time


 Stop Time Status Last Admin


Dose Admin


 


 Miscellaneous


 Information


  (Consult


 Glycemic


 Management


 Pharmacy)  1 ea  UD  PRN


 N/A  9/11/17 16:00


 10/11/17 15:59   


 


 


 Heparin Sodium


  (Porcine)


  (Heparin Sq 5000


 Unit/0.5ml)  5,000 unit  Q8


 SQ  9/11/17 22:00


 10/11/17 21:59  9/12/17 14:43


5,000 UNIT


 


 Acetaminophen


  (Tylenol Tab)  650 mg  Q4H  PRN


 PO  9/11/17 16:15


 10/11/17 16:14   


 


 


 Ondansetron HCl


  (Zofran Inj)  4 mg  Q6H  PRN


 IV  9/11/17 16:15


 10/11/17 16:14   


 


 


 Glucose


  (Glucose 40% Gel)  15-30


 GRAMS 15


 GRAMS...  UD  PRN


 PO  9/11/17 16:30


 10/11/17 16:29   


 


 


 Glucose


  (Glucose Chew


 Tab)  4-8


 Tablets 4


 Tabl...  UD  PRN


 PO  9/11/17 16:30


 10/11/17 16:29   


 


 


 Dextrose


  (Dextrose 50%


 50ML Syringe)  25-50ML OF


 50% DW IV


 FOR...  UD  PRN


 IV  9/11/17 16:30


 10/11/17 16:29   


 


 


 Glucagon


  (Glucagon Inj)  1 mg  UD  PRN


 SQ  9/11/17 16:30


 10/11/17 16:29   


 


 


 Aspirin


  (Ecotrin Tab)  81 mg  QAM


 PO  9/12/17 09:00


 10/12/17 08:59  9/12/17 07:57


81 MG


 


 Atorvastatin


 Calcium


  (Lipitor Tab)  10 mg  DAILY


 PO  9/12/17 09:00


 10/12/17 08:59  9/12/17 07:57


10 MG


 


 Gabapentin


  (Neurontin Cap)  100 mg  DAILY


 PO  9/12/17 09:00


 10/12/17 08:59  9/12/17 07:57


100 MG


 


 Insulin Aspart


  (novoLOG ASPART)  SLIDING


 SCALE  ACHS


 SC  9/11/17 21:00


 10/11/17 20:59  9/12/17 17:36


4 UNITS


 


 Tamsulosin HCl


  (Flomax Cap)  0.4 mg  QAM


 PO  9/12/17 09:00


 10/12/17 08:59  9/12/17 07:57


0.4 MG


 


 Insulin Glargine


  (Lantus Solostar


 Pen)  SEE


 PROTOCOL  BID


 SC  9/12/17 21:00


 10/12/17 20:59   


 


 


 Lidocaine HCl


  (Xylocaine Jelly


 2%)  3 ml  UD  PRN


 EXT  9/12/17 14:15


 10/12/17 14:14   


 











Review of Systems


Review of Systems


Constitutional:  + weight loss, No fever, No chills


Endocrine:  + excessive thirst, + tired/sluggish


Gastrointestinal:  + nausea


Cardiovascular:  No chest pain, No palpitations, No swelling ankles/feet


Respiratory:  No shortness of breath


Male :  + frequent urination, + weak stream, + blood in urine, + nocturia 

more than once/night





Physical Exam


Vital Signs:





Vital Signs Past 12 Hours








  Date Time  Temp Pulse Resp B/P (MAP) Pulse Ox O2 Delivery O2 Flow Rate FiO2


 


9/12/17 16:00      Nasal Cannula 2.0 


 


9/12/17 15:18 36.6 88 18 116/76 (89) 99 Nasal Cannula 2.0 


 


9/12/17 12:00      Nasal Cannula 2.0 


 


9/12/17 10:59 37.2 84 20 123/70 (87) 99  2.0 


 


9/12/17 09:01  105   97   


 


9/12/17 08:00      Nasal Cannula 2.0 


 


9/12/17 07:20 37.0 88 20 132/75 (38) 96  2.0 








Physical Exam:


General Appearance:  WD/WN, no apparent distress, + thin


Eyes:  bilateral eyes normal inspection


ENT:  hearing grossly normal


Neck:  no adenopathy, no JVD, trachea midline


Respiratory/Chest:  no respiratory distress, no accessory muscle use


Gastrointestinal:  


   Abdomen:  normal abdomen


   Bladder:  normal bladder


   Renal:  normal renal


   Hernia:  absent hernia


Genitourinary - Male:  


   Penis:  normal penis


   Urethral Meatus:  normal urethral meatus


   Testes:  normal testes


   Anus / Perineum:  normal anus/perineum


   Sphincter Tone:  normal sphincter tone


   Prostate:  normal prostate, size (40 grams flat, no nodules)


Extremities:  normal inspection, no pedal edema, no calf tenderness


Neurologic/Psychiatric:  alert, + pertinent finding (very poor insight into his 

medical situation )





Assessment & Plan


Assessment & Plan


Urinary retention


His lack of awareness of his severe retention suggests a long term retention 

with bladder detrusor failure.


Will keep wade at least a week.  needs bladder rest.


Then will need office cysto and void trial.


Will attempt bph treatment but doubt he will resume voiding.   Add tamsulosin 

and finasteride daily. 


I do not hink he can take care of a catheter himself at home and so he will 

need skilled nursing.

## 2017-09-12 NOTE — PHARMACY PROGRESS NOTE
Glycemic Control Intl Consult


Date of Service


Sep 12, 2017.





Scope


Glycemic Pharmacist consulted by Madina Ramos PA-C on 9/11/17 for glycemic 

control and to write orders per [facility] inpatient glycemic control protocol





Objective


Weight (Kilograms):  74.000


Accuchecks BSG (last 24hrs):











Test


  9/11/17


12:23 9/11/17


13:53 9/11/17


15:18 9/11/17


16:17


 


Bedside Glucose


  576 mg/dl


(70-99) 


  491 mg/dl


(70-99) 404 mg/dl


(70-99)


 


Random Glucose


  


  542 mg/dl


(70-99) 


  


 


 


Test


  9/11/17


17:16 9/11/17


17:42 9/11/17


17:51 9/11/17


18:41


 


Bedside Glucose


  284 mg/dl


(70-99) 224 mg/dl


(70-99) 228 mg/dl


(70-99) 119 mg/dl


(70-99)


 


Test


  9/11/17


18:56 9/11/17


19:08 9/11/17


19:25 9/11/17


19:43


 


Bedside Glucose


  108 mg/dl


(70-99) 81 mg/dl


(70-99) 68 mg/dl


(70-99) 72 mg/dl


(70-99)


 


Test


  9/11/17


20:03 9/11/17


20:13 9/11/17


21:43 9/12/17


01:50


 


Random Glucose


  117 mg/dl


(70-99) 


  


  


 


 


Bedside Glucose


  


  192 mg/dl


(70-99) 210 mg/dl


(70-99) 230 mg/dl


(70-99)


 


Test


  9/12/17


05:37 


  


  


 


 


Random Glucose


  211 mg/dl


(70-99) 


  


  


 








Laboratory Data (last 24hrs)











Test


  9/11/17


13:45 9/11/17


13:53 9/11/17


20:03 9/12/17


05:37


 


White Blood Count 13.08 K/uL    


 


Red Blood Count 3.79 M/uL    


 


Hemoglobin 10.9 g/dL    


 


Hematocrit 33.1 %    


 


Mean Corpuscular Volume 87.3 fL    


 


Mean Corpuscular Hemoglobin 28.8 pg    


 


Mean Corpuscular Hemoglobin


Concent 32.9 g/dl 


  


  


  


 


 


Platelet Count 196 K/uL    


 


Mean Platelet Volume 10.8 fL    


 


Neutrophils (%) (Auto) 78.2 %    


 


Lymphocytes (%) (Auto) 16.1 %    


 


Monocytes (%) (Auto) 4.7 %    


 


Eosinophils (%) (Auto) 0.6 %    


 


Basophils (%) (Auto) 0.2 %    


 


Neutrophils # (Auto) 10.23 K/uL    


 


Lymphocytes # (Auto) 2.11 K/uL    


 


Monocytes # (Auto) 0.61 K/uL    


 


Eosinophils # (Auto) 0.08 K/uL    


 


Basophils # (Auto) 0.02 K/uL    


 


Anion Gap  7.0 mmol/L  5.0 mmol/L  5.0 mmol/L 


 


BUN/Creatinine Ratio  18.6  20.0  22.8 


 


Blood Urea Nitrogen  37 mg/dl  36 mg/dl  32 mg/dl 


 


Creatinine  2.00 mg/dl  1.80 mg/dl  1.40 mg/dl 


 


Potassium Level  5.0 mmol/L  3.9 mmol/L  4.8 mmol/L 


 


Sodium Level  133 mmol/L  141 mmol/L  141 mmol/L 


 


Hemoglobin A1c    15.0 % 








HbA1c











Test


  9/12/17


05:37


 


Hemoglobin A1c


  15.0 %


(4.5-5.6)  H











Recent Pertinent Medications


Outpatient Anti-diabetic Regimen: 


* Glipizide XL 10 mg daily (has not been taking for the past few days)








The patient is currently receiving:


* Basal insulin:              no basal currently





* Correctional Insulin:     Novolog Correction per scale ACHS


                            Goal Range: Low 120 mg/dL - High 160 mg/dL


                            Correction Factor: 25 mg/dL/unit





* Prandial insulin:           Per carb ratio of 1 unit per 12 grams CHO consumed











Risk Factors for Insulin Resistance:


* Diet: type 2 diabetic diet





Assessment & Plan


ASSESSMENT:


* ADA & AACE recommend a goal blood sugar range 140-180 mg/dl for the majority 

of critically ill & non-critically ill patients.  However, more stringent 

targets may be selected in individual cases.


* Mr Graham is a 73 y/o M with a PMH of HTN, cellulitis, and CKD stage 3 who was 

seen at the surgery center for an eye biopsy. The patient's blood sugar was 

over 500 mg/dL, and he was sent to the emergency room. Patient has not been 

taking his oral glipizide for several days because he forgot it at home. 

Patient was initially started on an insulin infusion; his blood sugars 

decreased from 491 mg/dL to 68 mg/dL rapidly indicating the patient may have 

insulin sensitivity. Overnight patient received 5 units of Novolog with no 

change in blood sugars.


* For this morning's fasting blood sugar of 211 mg/dL, will start Lantus weight-

based stress of 2 dose (which is 0.2 units/kg). Will initiate a scale for this 

evening as there is uncertainty as to how the patient will react. Correctional 

insulin will be tightened slightly.





PLAN FOR INPATIENT GLYCEMIC CONTROL:


* Basal insulin with LANTUS 15 units SQ x 1 then Lantus 0-15 units BID (Lantus 

0 units if blood sugar less than 120 mg/dL; Lantus 10 units if blood sugar 120-

160 mg/dL; Lantus 15 units if blood sugar greater than 160 mg/L)





* Correctional Insulin with NOVOLOG per scale ACHS or Q6hrs while NPO


 * Goal Range:  Low 140 mg/dL - High 180 mg/dL


 * Correction Factor: 25 mg/dL/unit


 * Nutritional / Prandial insulin per carb ratio of 1 unit per 8 grams CHO 

consumed








* Please note that the plan above was derived based on current level of insulin 

resistance and hospital stress. These recommendations are appropriate for 

inpatient admission only. Plan of care upon discharge will need to be 

reassessed to avoid potential outpatient hypo/hyperglycemia. 





Thank you.

## 2017-09-13 VITALS
TEMPERATURE: 98.42 F | HEART RATE: 90 BPM | OXYGEN SATURATION: 97 % | SYSTOLIC BLOOD PRESSURE: 144 MMHG | DIASTOLIC BLOOD PRESSURE: 85 MMHG

## 2017-09-13 VITALS
DIASTOLIC BLOOD PRESSURE: 80 MMHG | OXYGEN SATURATION: 97 % | HEART RATE: 91 BPM | SYSTOLIC BLOOD PRESSURE: 142 MMHG | TEMPERATURE: 98.06 F

## 2017-09-13 VITALS
HEART RATE: 90 BPM | OXYGEN SATURATION: 99 % | DIASTOLIC BLOOD PRESSURE: 84 MMHG | TEMPERATURE: 98.6 F | SYSTOLIC BLOOD PRESSURE: 152 MMHG

## 2017-09-13 VITALS — OXYGEN SATURATION: 98 %

## 2017-09-13 VITALS
DIASTOLIC BLOOD PRESSURE: 68 MMHG | HEART RATE: 96 BPM | TEMPERATURE: 98.78 F | OXYGEN SATURATION: 98 % | SYSTOLIC BLOOD PRESSURE: 112 MMHG

## 2017-09-13 VITALS — HEART RATE: 89 BPM | DIASTOLIC BLOOD PRESSURE: 76 MMHG | OXYGEN SATURATION: 99 % | SYSTOLIC BLOOD PRESSURE: 135 MMHG

## 2017-09-13 LAB
ANION GAP SERPL CALC-SCNC: 7 MMOL/L (ref 3–11)
BUN SERPL-MCNC: 20 MG/DL (ref 7–18)
BUN/CREAT SERPL: 18 (ref 10–20)
CALCIUM SERPL-MCNC: 8.4 MG/DL (ref 8.5–10.1)
CHLORIDE SERPL-SCNC: 108 MMOL/L (ref 98–107)
CO2 SERPL-SCNC: 27 MMOL/L (ref 21–32)
CREAT CL PREDICTED SERPL C-G-VRATE: 64.5 ML/MIN
CREAT SERPL-MCNC: 1.1 MG/DL (ref 0.6–1.4)
GLUCOSE SERPL-MCNC: 116 MG/DL (ref 70–99)
MAGNESIUM SERPL-MCNC: 2.2 MG/DL (ref 1.8–2.4)
PHOSPHATE SERPL-MCNC: 2.8 MG/DL (ref 2.5–4.9)
POTASSIUM SERPL-SCNC: 4.2 MMOL/L (ref 3.5–5.1)
SODIUM SERPL-SCNC: 142 MMOL/L (ref 136–145)

## 2017-09-13 RX ADMIN — Medication SCH MG: at 08:10

## 2017-09-13 RX ADMIN — TAMSULOSIN HYDROCHLORIDE SCH MG: 0.4 CAPSULE ORAL at 21:06

## 2017-09-13 RX ADMIN — FINASTERIDE SCH MG: 5 TABLET, FILM COATED ORAL at 08:11

## 2017-09-13 RX ADMIN — INSULIN ASPART SCH UNITS: 100 INJECTION, SOLUTION INTRAVENOUS; SUBCUTANEOUS at 08:16

## 2017-09-13 RX ADMIN — HEPARIN SODIUM SCH UNIT: 10000 INJECTION, SOLUTION INTRAVENOUS; SUBCUTANEOUS at 05:55

## 2017-09-13 RX ADMIN — ATORVASTATIN CALCIUM SCH MG: 10 TABLET, FILM COATED ORAL at 08:10

## 2017-09-13 RX ADMIN — INSULIN ASPART SCH UNITS: 100 INJECTION, SOLUTION INTRAVENOUS; SUBCUTANEOUS at 12:38

## 2017-09-13 RX ADMIN — INSULIN ASPART SCH UNITS: 100 INJECTION, SOLUTION INTRAVENOUS; SUBCUTANEOUS at 17:43

## 2017-09-13 RX ADMIN — INSULIN ASPART SCH UNITS: 100 INJECTION, SOLUTION INTRAVENOUS; SUBCUTANEOUS at 21:00

## 2017-09-13 RX ADMIN — HEPARIN SODIUM SCH UNIT: 10000 INJECTION, SOLUTION INTRAVENOUS; SUBCUTANEOUS at 14:23

## 2017-09-13 RX ADMIN — GABAPENTIN SCH MG: 100 CAPSULE ORAL at 08:10

## 2017-09-13 RX ADMIN — INSULIN GLARGINE SCH UNITS: 100 INJECTION, SOLUTION SUBCUTANEOUS at 08:18

## 2017-09-13 RX ADMIN — HEPARIN SODIUM SCH UNIT: 10000 INJECTION, SOLUTION INTRAVENOUS; SUBCUTANEOUS at 21:09

## 2017-09-13 NOTE — PHARMACY PROGRESS NOTE
Glycemic Control Progress Note


Date of Service


Sep 13, 2017.





Scope


Glycemic Pharmacist consulted for glycemic control to write orders per [facility] 

inpatient glycemic control protocol.





Objective


Accuchecks BSG (last 24hrs):











Test


  9/12/17


11:22 9/12/17


16:10 9/12/17


20:26 9/13/17


05:37


 


Bedside Glucose


  285 mg/dl


(70-99) 134 mg/dl


(70-99) 177 mg/dl


(70-99) 


 


 


Random Glucose


  


  


  


  116 mg/dl


(70-99)


 


Test


  9/13/17


07:07 


  


  


 


 


Bedside Glucose


  126 mg/dl


(70-99) 


  


  


 








HbA1c:











Test


  9/12/17


05:37


 


Hemoglobin A1c


  15.0 %


(4.5-5.6)  H











Recent Pertinent Medications


Outpatient Anti-diabetic Regimen: 


* Glipizide XL 10 mg daily (has not been taking for the past few days)








The patient is currently receiving:


* Basal insulin:              Lantus 0-15 units SQ BID (Lantus 0 units if blood 

sugar less than 120 mg/dL; Lantus 10 units if blood sugar 120-160 mg/dL; Lantus 

15 units if blood sugar greater than 160 mg/dL)





* Correctional Insulin:     Novolog Correction per scale ACHS


                            Goal Range: Low 140 mg/dL - High 180 mg/dL


                            Correction Factor: 25 mg/dL/unit





* Prandial insulin:           Per carb ratio of 1 unit per 8 grams CHO consumed











Risk Factors for Insulin Resistance:


* Diet: type 2 diabetic diet





Outpatient Anti-Diabetic Meds


see above





Assessment & Plan


ASSESSMENT:


* ADA & AACE recommend a goal blood sugar range 140-180 mg/dl for the majority 

of critically ill & non-critically ill patients.  However, more stringent 

targets may be selected in individual cases.


* Mr Graham is a 71 y/o M with a PMH of HTN, cellulitis, and CKD stage 3 who was 

seen at the surgery center for an eye biopsy. The patient's blood sugar was 

over 500 mg/dL, and he was sent to the emergency room. Patient has not been 

taking his oral glipizide for several days because he forgot it at home. 

Patient was initially started on an insulin infusion; his blood sugars 

decreased from 491 mg/dL to 68 mg/dL rapidly indicating the patient may have 

insulin sensitivity. 


* Yesterday, the patient required around 51 units of insulin (30 units of basal 

and 21 units of bolus). Blood sugars ranged from 134-230 mg/dL. Fasting this 

morning was 126 mg/dL which is reasonable but with patient's high HbA1C may be 

too low. Per scale, patient received Lantus 10 units. Plan to transition 

tomorrow to once daily Lantus dosing for outpatient dosing. Began Lantus 10 

units tonight and then once daily Lantus 25 units tomorrow. Unsure of patient's 

true basal needs but 25 units is a reduced dose from 30 units which appears to 

be too aggressive.


* Post-prandial blood sugars fluctuated greatly yesterday; however, after 

Lantus was given, dropped below 200 mg/dL. Will continue same parameters and 

loosen carbohydrate coverage if blood sugars drop further at lunch.





PLAN FOR INPATIENT GLYCEMIC CONTROL:


* Basal insulin with LANTUS 10 units SQ tonight and then 25 units once daily 

starting tomorrow 





* Correctional Insulin with NOVOLOG per scale ACHS or Q6hrs while NPO


 * Goal Range:  Low 140 mg/dL - High 180 mg/dL


 * Correction Factor: 25 mg/dL/unit


 * Nutritional / Prandial insulin per carb ratio of 1 unit per 8 grams CHO 

consumed








OUTPATIENT RECOMMENDATIONS


* Currently, the patient's HbA1C is above goal range significantly. Recommend 

sending patient home with Lantus (may start at 20 units daily) if able to be 

compliant. If sent home on Lantus, would not recommend continuing glipizide as 

this can increase risk of hypoglycemia.





Thank you.

## 2017-09-13 NOTE — PROGRESS NOTE
Internal Med Progress Note


Date of Service:


Sep 13, 2017.


Provider Documentation:





SUBJECTIVE:





The patient was seen and examined 


Denies  any symptoms


Has Poor eye sight


Can not manage his own Insulin











OBJECTIVE:





Vital Signs-as noted below





Exam:


General-NO distress at rest


Eyes-normal


ENT-normal


Neck-supple


Lungs-clear to auscultate bilaterally 


Heart-Regular,no murmur 


Abdomen-Benign,no masses,bowel sound present 


Extremities-Np edema 


Neuro-AAOx3





Lab data as noted below.


ASSESSMENT & PLAN:





Uncontrolled Diabetes


Potential to get worse with DKA/Hyperosmolar state 


Last HbA1c A1c (9.6 in 2/2017)


Likely secondary to medication non-compliance, dietary indiscretion, rule out 

infection- afebrile, +mild leukocytosis (WBC 13K) f/u CXR and UA


Started on  insulin drip and monitor electrolytes


Consult pharmacy for glycemic control-appreciate input 


Check [id number] at 15


Diabetes educator consult, also recommend podiatry referral as outpatient


Will need placement or home with oral antidiabetic pills





HYPOXIA-without any definite cause 


Was placed on O2 nasal cannula in ER, was hypoxic to high 80's upon removal by 

ER RN


Not on home O2- check CXR to r/o PNA given leukocytosis and c/o cough


Continue supplemental O2 per protocol 


Resolved 





DEANNE ON CKD STAGE III


Creat is 2.0, baseline 1.2


Possibly secondary to dehydration; 


No h/o NSAID use 


Hold lisinopril


Received 1 liter IVF's in ER- continue at 125 cc/hour


Avoid nephrotoxins when able 


Creatinine improving





URINARY RETENTION


Unable to void today, bladder scan + urinary retention in ER- 1450 mL output on 

straight cath 


UA pending-negative


Likely secondary to BPH 


Bladder scan and straight cath PRN


Urology consulted-appreciate Input


S/P Grady placement and Flomax added








CHRONIC ANEMIA


Hg is 10.9, was 11's back in 2013


Follow up as outpatient





DYSLIPIDEMIA


Continue statin





DVT PROPHYLAXIS


Heparin SQ





CODE STATUS


Full code per my discussion with the patient





DISPOSITION


Admission telemetry


May need placement, lives alone, friend concerned he is unable to care for 

himself at home, and she is unable to care for him after discharge


PT, OT, social service evaluation


Follows with Dr. Stephanie Covarrubias for primary care


Vital Signs:











  Date Time  Temp Pulse Resp B/P (MAP) Pulse Ox O2 Delivery O2 Flow Rate FiO2


 


9/13/17 15:04 37.1 96 18 112/68 (83) 98 Nasal Cannula 2.0 


 


9/13/17 12:20      Nasal Cannula 2.0 


 


9/13/17 11:32 37.0 90 20 152/84 (106) 99 Nasal Cannula 2.0 


 


9/13/17 08:00      Nasal Cannula 2.0 


 


9/13/17 07:30 36.7 91 20 142/80 (100) 97 Nasal Cannula 2.0 


 


9/13/17 04:00      Nasal Cannula 2.0 


 


9/13/17 03:15  89 19 135/76 (95) 99 Nasal Cannula 2.0 


 


9/13/17 00:00      Nasal Cannula 2.0 


 


9/12/17 23:15 37.0 86 18 118/61 (80) 98 Nasal Cannula 2.0 


 


9/12/17 20:00      Nasal Cannula 2.0 


 


9/12/17 19:11 37.2 90 20 144/73 (96) 99 Nasal Cannula 2.0 


 


9/12/17 16:00      Nasal Cannula 2.0 








Lab Results:





Results Past 24 Hours








Test


  9/12/17


16:10 9/12/17


20:26 9/13/17


05:37 9/13/17


07:07 Range/Units


 


 


Bedside Glucose 134 177  126 70-99  mg/dl


 


Sodium Level   142  136-145  mmol/L


 


Potassium Level   4.2  3.5-5.1  mmol/L


 


Chloride Level   108    mmol/L


 


Carbon Dioxide Level   27  21-32  mmol/L


 


Anion Gap   7.0  3-11  mmol/L


 


Blood Urea Nitrogen   20  7-18  mg/dl


 


Creatinine


  


  


  1.10


  


  0.60-1.40


mg/dl


 


Est Creatinine Clear Calc


Drug Dose 


  


  64.5


  


   ml/min


 


 


Estimated GFR (


American) 


  


  77.9


  


   


 


 


Estimated GFR (Non-


American 


  


  67.2


  


   


 


 


BUN/Creatinine Ratio   18.0  10-20  


 


Random Glucose   116  70-99  mg/dl


 


Calcium Level   8.4  8.5-10.1  mg/dl


 


Phosphorus Level   2.8  2.5-4.9  mg/dl


 


Magnesium Level   2.2  1.8-2.4  mg/dl


 


Test


  9/13/17


11:50 


  


  


  Range/Units


 


 


Bedside Glucose 161    70-99  mg/dl

## 2017-09-14 VITALS — OXYGEN SATURATION: 97 % | SYSTOLIC BLOOD PRESSURE: 153 MMHG | DIASTOLIC BLOOD PRESSURE: 86 MMHG | HEART RATE: 125 BPM

## 2017-09-14 VITALS
HEART RATE: 96 BPM | SYSTOLIC BLOOD PRESSURE: 159 MMHG | TEMPERATURE: 97.7 F | DIASTOLIC BLOOD PRESSURE: 88 MMHG | OXYGEN SATURATION: 96 %

## 2017-09-14 VITALS
TEMPERATURE: 97.7 F | SYSTOLIC BLOOD PRESSURE: 136 MMHG | DIASTOLIC BLOOD PRESSURE: 76 MMHG | HEART RATE: 95 BPM | OXYGEN SATURATION: 97 %

## 2017-09-14 VITALS
OXYGEN SATURATION: 97 % | SYSTOLIC BLOOD PRESSURE: 99 MMHG | TEMPERATURE: 97.7 F | HEART RATE: 97 BPM | DIASTOLIC BLOOD PRESSURE: 64 MMHG

## 2017-09-14 VITALS — HEART RATE: 113 BPM | DIASTOLIC BLOOD PRESSURE: 80 MMHG | SYSTOLIC BLOOD PRESSURE: 142 MMHG | OXYGEN SATURATION: 98 %

## 2017-09-14 VITALS
TEMPERATURE: 98.42 F | DIASTOLIC BLOOD PRESSURE: 91 MMHG | OXYGEN SATURATION: 96 % | HEART RATE: 98 BPM | SYSTOLIC BLOOD PRESSURE: 164 MMHG

## 2017-09-14 VITALS
TEMPERATURE: 97.88 F | DIASTOLIC BLOOD PRESSURE: 71 MMHG | OXYGEN SATURATION: 93 % | SYSTOLIC BLOOD PRESSURE: 120 MMHG | HEART RATE: 87 BPM

## 2017-09-14 VITALS
OXYGEN SATURATION: 98 % | DIASTOLIC BLOOD PRESSURE: 83 MMHG | HEART RATE: 92 BPM | TEMPERATURE: 97.52 F | SYSTOLIC BLOOD PRESSURE: 138 MMHG

## 2017-09-14 VITALS
HEART RATE: 91 BPM | TEMPERATURE: 99.32 F | DIASTOLIC BLOOD PRESSURE: 72 MMHG | OXYGEN SATURATION: 98 % | SYSTOLIC BLOOD PRESSURE: 124 MMHG

## 2017-09-14 VITALS — DIASTOLIC BLOOD PRESSURE: 63 MMHG | OXYGEN SATURATION: 98 % | HEART RATE: 114 BPM | SYSTOLIC BLOOD PRESSURE: 124 MMHG

## 2017-09-14 VITALS
TEMPERATURE: 98.24 F | HEART RATE: 74 BPM | DIASTOLIC BLOOD PRESSURE: 88 MMHG | SYSTOLIC BLOOD PRESSURE: 130 MMHG | OXYGEN SATURATION: 96 %

## 2017-09-14 VITALS — OXYGEN SATURATION: 93 % | SYSTOLIC BLOOD PRESSURE: 112 MMHG | DIASTOLIC BLOOD PRESSURE: 58 MMHG

## 2017-09-14 VITALS
SYSTOLIC BLOOD PRESSURE: 134 MMHG | TEMPERATURE: 98.6 F | DIASTOLIC BLOOD PRESSURE: 84 MMHG | OXYGEN SATURATION: 97 % | HEART RATE: 96 BPM

## 2017-09-14 VITALS
OXYGEN SATURATION: 97 % | HEART RATE: 95 BPM | DIASTOLIC BLOOD PRESSURE: 76 MMHG | TEMPERATURE: 97.7 F | SYSTOLIC BLOOD PRESSURE: 136 MMHG

## 2017-09-14 VITALS — OXYGEN SATURATION: 97 %

## 2017-09-14 VITALS
TEMPERATURE: 97.52 F | OXYGEN SATURATION: 97 % | HEART RATE: 94 BPM | SYSTOLIC BLOOD PRESSURE: 152 MMHG | DIASTOLIC BLOOD PRESSURE: 85 MMHG

## 2017-09-14 VITALS — OXYGEN SATURATION: 96 % | HEART RATE: 102 BPM | DIASTOLIC BLOOD PRESSURE: 82 MMHG | SYSTOLIC BLOOD PRESSURE: 149 MMHG

## 2017-09-14 LAB
ANION GAP SERPL CALC-SCNC: 6 MMOL/L (ref 3–11)
ANION GAP SERPL CALC-SCNC: 7 MMOL/L (ref 3–11)
BUN SERPL-MCNC: 18 MG/DL (ref 7–18)
BUN SERPL-MCNC: 21 MG/DL (ref 7–18)
BUN/CREAT SERPL: 15.1 (ref 10–20)
BUN/CREAT SERPL: 16.7 (ref 10–20)
CALCIUM SERPL-MCNC: 8.9 MG/DL (ref 8.5–10.1)
CALCIUM SERPL-MCNC: 9.5 MG/DL (ref 8.5–10.1)
CHLORIDE SERPL-SCNC: 106 MMOL/L (ref 98–107)
CHLORIDE SERPL-SCNC: 106 MMOL/L (ref 98–107)
CO2 SERPL-SCNC: 26 MMOL/L (ref 21–32)
CO2 SERPL-SCNC: 28 MMOL/L (ref 21–32)
CREAT CL PREDICTED SERPL C-G-VRATE: 50.1 ML/MIN
CREAT CL PREDICTED SERPL C-G-VRATE: 63.8 ML/MIN
CREAT SERPL-MCNC: 1.1 MG/DL (ref 0.6–1.4)
CREAT SERPL-MCNC: 1.4 MG/DL (ref 0.6–1.4)
EOSINOPHIL NFR BLD AUTO: 186 K/UL (ref 130–400)
EOSINOPHIL NFR BLD AUTO: 217 K/UL (ref 130–400)
GLUCOSE SERPL-MCNC: 101 MG/DL (ref 70–99)
GLUCOSE SERPL-MCNC: 111 MG/DL (ref 70–99)
HCT VFR BLD CALC: 31.7 % (ref 42–52)
HCT VFR BLD CALC: 34.9 % (ref 42–52)
MAGNESIUM SERPL-MCNC: 2.2 MG/DL (ref 1.8–2.4)
MAGNESIUM SERPL-MCNC: 2.3 MG/DL (ref 1.8–2.4)
MCH RBC QN AUTO: 28.5 PG (ref 25–34)
MCH RBC QN AUTO: 29.1 PG (ref 25–34)
MCHC RBC AUTO-ENTMCNC: 32.4 G/DL (ref 32–36)
MCHC RBC AUTO-ENTMCNC: 32.8 G/DL (ref 32–36)
MCV RBC AUTO: 87.9 FL (ref 80–100)
MCV RBC AUTO: 88.5 FL (ref 80–100)
PHOSPHATE SERPL-MCNC: 3.3 MG/DL (ref 2.5–4.9)
PMV BLD AUTO: 10.3 FL (ref 7.4–10.4)
PMV BLD AUTO: 10.8 FL (ref 7.4–10.4)
POTASSIUM SERPL-SCNC: 4.2 MMOL/L (ref 3.5–5.1)
POTASSIUM SERPL-SCNC: 4.3 MMOL/L (ref 3.5–5.1)
RBC # BLD AUTO: 3.58 M/UL (ref 4.7–6.1)
RBC # BLD AUTO: 3.97 M/UL (ref 4.7–6.1)
SODIUM SERPL-SCNC: 139 MMOL/L (ref 136–145)
SODIUM SERPL-SCNC: 140 MMOL/L (ref 136–145)
WBC # BLD AUTO: 8.95 K/UL (ref 4.8–10.8)
WBC # BLD AUTO: 9.02 K/UL (ref 4.8–10.8)

## 2017-09-14 RX ADMIN — ATORVASTATIN CALCIUM SCH MG: 10 TABLET, FILM COATED ORAL at 08:10

## 2017-09-14 RX ADMIN — HEPARIN SODIUM SCH UNIT: 10000 INJECTION, SOLUTION INTRAVENOUS; SUBCUTANEOUS at 22:16

## 2017-09-14 RX ADMIN — INSULIN ASPART SCH UNITS: 100 INJECTION, SOLUTION INTRAVENOUS; SUBCUTANEOUS at 12:30

## 2017-09-14 RX ADMIN — INSULIN ASPART SCH UNITS: 100 INJECTION, SOLUTION INTRAVENOUS; SUBCUTANEOUS at 22:14

## 2017-09-14 RX ADMIN — HEPARIN SODIUM SCH UNIT: 10000 INJECTION, SOLUTION INTRAVENOUS; SUBCUTANEOUS at 06:11

## 2017-09-14 RX ADMIN — GABAPENTIN SCH MG: 100 CAPSULE ORAL at 08:10

## 2017-09-14 RX ADMIN — TAMSULOSIN HYDROCHLORIDE SCH MG: 0.4 CAPSULE ORAL at 22:11

## 2017-09-14 RX ADMIN — FINASTERIDE SCH MG: 5 TABLET, FILM COATED ORAL at 08:10

## 2017-09-14 RX ADMIN — HEPARIN SODIUM SCH UNIT: 10000 INJECTION, SOLUTION INTRAVENOUS; SUBCUTANEOUS at 14:41

## 2017-09-14 RX ADMIN — INSULIN ASPART SCH UNITS: 100 INJECTION, SOLUTION INTRAVENOUS; SUBCUTANEOUS at 16:30

## 2017-09-14 RX ADMIN — INSULIN ASPART SCH UNITS: 100 INJECTION, SOLUTION INTRAVENOUS; SUBCUTANEOUS at 08:13

## 2017-09-14 RX ADMIN — Medication SCH MG: at 08:10

## 2017-09-14 NOTE — DISCHARGE INSTRUCTIONS
Discharge Instructions


Date of Service


Sep 14, 2017.





Admission


Reason for Admission:  Acute Kidney Injury, Hyperglycemia





Discharge


Discharge Diagnosis / Problem:  Uncontrolled DM-started on Insulin,BPH s/p 

Grady placement





Discharge Goals


Goal(s):  Prevent Disease Progression





Activity Recommendations


Activity Level:  Assistance Required


Therapies:  Physical Therapy, Occupational Therapy





.





Additional Information


Patient informed of condition:  Yes


Advance Directives:  No


DNR:  No


Level of Care:  Skilled


Communicable Disease:  No


Prognosis:  Stable


Oxygen at (LPM):  2 liters/min via NC as needed


Grady Catheter:  Yes (Please make an appointment with Dr Greer in 1 week)





Instructions / Follow-Up


Instructions / Follow-Up


Dr Greer (Urology ) in 1 week.Pl make an appointment with your PCP within 1 

week following discharge from the facility





Current Hospital Diet


Patient's current hospital diet: Diabetes Type 2 Diet, AHA Diet (Heart Healthy)





Discharge Diet


Recommended Diet:  AHA Diet (Heart Healthy), Diabetes Type 2 Diet





Pending Studies


Studies pending at discharge:  no





Physician Orders On Transfer


POLST Discussion:  Not Applicable





Laboratory Results





Hemoglobin A1c








Test


  9/12/17


05:37 Range/Units


 


 


Estimated Average Glucose 384   mg/dl


 


Hemoglobin A1c 15.0 H 4.5-5.6  %











Medical Emergencies








.


Who to Call and When:





Medical Emergencies:  If at any time you feel your situation is an emergency, 

please call 911 immediately.





.





Non-Emergent Contact


Non-Emergency issues call your:  Primary Care Provider





.





Past History


Medical & Surgical History:  


(1) BPH (benign prostatic hyperplasia)


(2) Benign hypertension


(3) Diabetes mellitus type 2


(4) GERD (gastroesophageal reflux disease)


(5) DEANNE (acute kidney injury)


(6) S/P tonsillectomy


(7) H/O skin graft


.








"Provider Documentation" section prepared by Neeta Leon.








.





Core Measure Problem


Core Measures:  None

## 2017-09-14 NOTE — PROGRESS NOTE
Progress Note


Date of Service


Sep 14, 2017.





Progress Note


Patient was about to leave the hospital


Was taken out of Bed on a chair


Got dizzy and voiced that he is about to die





Looked very Pale and BP low at Systolic 60s


No LOC and no focal deficit noted


Patient was put back in Bed-color came back 


1000 mls NS bolus ordered,labs drawn ,discharge cancelled





DR EDIE Leon

## 2017-09-15 VITALS
DIASTOLIC BLOOD PRESSURE: 70 MMHG | HEART RATE: 94 BPM | TEMPERATURE: 98.24 F | SYSTOLIC BLOOD PRESSURE: 117 MMHG | OXYGEN SATURATION: 97 %

## 2017-09-15 VITALS
TEMPERATURE: 98.06 F | HEART RATE: 91 BPM | OXYGEN SATURATION: 97 % | DIASTOLIC BLOOD PRESSURE: 69 MMHG | SYSTOLIC BLOOD PRESSURE: 124 MMHG

## 2017-09-15 VITALS
DIASTOLIC BLOOD PRESSURE: 79 MMHG | TEMPERATURE: 98.96 F | SYSTOLIC BLOOD PRESSURE: 127 MMHG | HEART RATE: 109 BPM | OXYGEN SATURATION: 96 %

## 2017-09-15 VITALS
OXYGEN SATURATION: 99 % | SYSTOLIC BLOOD PRESSURE: 117 MMHG | HEART RATE: 83 BPM | DIASTOLIC BLOOD PRESSURE: 71 MMHG | TEMPERATURE: 97.88 F

## 2017-09-15 VITALS
DIASTOLIC BLOOD PRESSURE: 69 MMHG | HEART RATE: 92 BPM | SYSTOLIC BLOOD PRESSURE: 109 MMHG | OXYGEN SATURATION: 96 % | TEMPERATURE: 98.42 F

## 2017-09-15 VITALS
OXYGEN SATURATION: 91 % | TEMPERATURE: 98.24 F | SYSTOLIC BLOOD PRESSURE: 145 MMHG | DIASTOLIC BLOOD PRESSURE: 72 MMHG | HEART RATE: 96 BPM

## 2017-09-15 VITALS — OXYGEN SATURATION: 91 %

## 2017-09-15 RX ADMIN — HEPARIN SODIUM SCH UNIT: 10000 INJECTION, SOLUTION INTRAVENOUS; SUBCUTANEOUS at 20:51

## 2017-09-15 RX ADMIN — INSULIN ASPART SCH UNITS: 100 INJECTION, SOLUTION INTRAVENOUS; SUBCUTANEOUS at 20:50

## 2017-09-15 RX ADMIN — INSULIN ASPART SCH UNITS: 100 INJECTION, SOLUTION INTRAVENOUS; SUBCUTANEOUS at 18:05

## 2017-09-15 RX ADMIN — GABAPENTIN SCH MG: 100 CAPSULE ORAL at 08:31

## 2017-09-15 RX ADMIN — ATORVASTATIN CALCIUM SCH MG: 10 TABLET, FILM COATED ORAL at 08:31

## 2017-09-15 RX ADMIN — INSULIN ASPART SCH UNITS: 100 INJECTION, SOLUTION INTRAVENOUS; SUBCUTANEOUS at 12:14

## 2017-09-15 RX ADMIN — FINASTERIDE SCH MG: 5 TABLET, FILM COATED ORAL at 08:32

## 2017-09-15 RX ADMIN — HEPARIN SODIUM SCH UNIT: 10000 INJECTION, SOLUTION INTRAVENOUS; SUBCUTANEOUS at 05:33

## 2017-09-15 RX ADMIN — TAMSULOSIN HYDROCHLORIDE SCH MG: 0.4 CAPSULE ORAL at 20:45

## 2017-09-15 RX ADMIN — HEPARIN SODIUM SCH UNIT: 10000 INJECTION, SOLUTION INTRAVENOUS; SUBCUTANEOUS at 12:14

## 2017-09-15 RX ADMIN — INSULIN ASPART SCH UNITS: 100 INJECTION, SOLUTION INTRAVENOUS; SUBCUTANEOUS at 08:36

## 2017-09-15 RX ADMIN — Medication SCH MG: at 08:31

## 2017-09-15 NOTE — PROGRESS NOTE
Internal Med Progress Note


Date of Service:


Sep 15, 2017.


Provider Documentation:





SUBJECTIVE:





The patient was seen and examined 


Hypotensive episode yesterday 


Mo more episodes


Orthostasis is positive for BP but no symptoms














OBJECTIVE:





Vital Signs-as noted below





Exam:


General-NO distress at rest


Eyes-normal


ENT-normal


Neck-supple


Lungs-clear to auscultate bilaterally 


Heart-Regular,no murmur 


Abdomen-Benign,no masses,bowel sound present 


Extremities-No edema 


Neuro-AAOx3





Lab data as noted below.


ASSESSMENT & PLAN:





Orthostatic Hypotension


Significant drop in SBP but no change in Pulse and or symptoms


Ambulation well 


No desaturation


Will discharge 











Uncontrolled Diabetes


Potential to get worse with DKA/Hyperosmolar state 


Last HbA1c A1c (9.6 in 2/2017)


Likely secondary to medication non-compliance, dietary indiscretion, rule out 

infection- afebrile, +mild leukocytosis (WBC 13K) f/u CXR and UA


Started on  insulin drip and monitor electrolytes


Consult pharmacy for glycemic control-appreciate input 


Check [id number] at 15


Diabetes educator consult, also recommend podiatry referral as outpatient


Discharge to [facility] on Insulin today





HYPOXIA-without any definite cause 


Was placed on O2 nasal cannula in ER, was hypoxic to high 80's upon removal by 

ER RN


Not on home O2- check CXR to r/o PNA given leukocytosis and c/o cough


Continue supplemental O2 per protocol 


Resolved 


Ambulating without any drop in Saturation





DEANNE ON CKD STAGE III


Creat is 2.0, baseline 1.2


Possibly secondary to dehydration; 


No h/o NSAID use 


Hold lisinopril


Received 1 liter IVF's in ER- continue at 125 cc/hour


Avoid nephrotoxins when able 


Creatinine improving


Will restart Lisinopril





URINARY RETENTION


Unable to void today, bladder scan + urinary retention in ER- 1450 mL output on 

straight cath 


UA pending-negative


Likely secondary to BPH 


Bladder scan and straight cath PRN


Urology consulted-appreciate Input


S/P Grady placement and Flomax added


Will need to see Urology in 1 week








CHRONIC ANEMIA


Hg is 10.9, was 11's back in 2013


Follow up as outpatient





DYSLIPIDEMIA


Continue statin





DVT PROPHYLAXIS


Heparin SQ





CODE STATUS


Full code per my discussion with the patient





DISPOSITION


Admission telemetry


May need placement, lives alone, friend concerned he is unable to care for 

himself at home, and she is unable to care for him after discharge


PT, OT, social service evaluation


Follows with Dr. Stephanie Covarrubias for primary care


Discharge to [facility] 9/15/17


Vital Signs:











  Date Time  Temp Pulse Resp B/P (MAP) Pulse Ox O2 Delivery O2 Flow Rate FiO2


 


9/15/17 12:15      Nasal Cannula 2.0 


 


9/15/17 11:35 37.2 92 16 141/79 (99) 96 Room Air  





  96  127/69 (88)    





  109  111/67 (82)    


 


9/15/17 08:30      Nasal Cannula 2.0 


 


9/15/17 07:38 36.8 94 16 117/70 (86) 97 Nasal Cannula 2.0 


 


9/15/17 04:00      Nasal Cannula 2.0 


 


9/15/17 03:56 36.6 83 16 117/71 (86) 99 Nasal Cannula 2.0 


 


9/15/17 00:00      Nasal Cannula 2.0 


 


9/14/17 23:47 37.0 96 16 134/84 (101) 97 Nasal Cannula 2.0 


 


9/14/17 20:08  125 16 153/86 (108) 97 Nasal Cannula 2.0 


 


9/14/17 20:06  113  142/80 (100) 98 Nasal Cannula 2.0 


 


9/14/17 20:04  114 18 124/63 (83) 98 Nasal Cannula 2.0 


 


9/14/17 20:02  102  149/82 (104) 96 Nasal Cannula 2.0 


 


9/14/17 20:00 36.9 98 16 164/91 (115) 96 Nasal Cannula 2.0 


 


9/14/17 20:00     96 Nasal Cannula 2.0 


 


9/14/17 19:58 36.8 74 16 130/88 (102) 96 Nasal Cannula 2.0 


 


9/14/17 18:26 36.5 96 16 159/88 (111) 96 Nasal Cannula 2.0 


 


9/14/17 16:56    112/58 (76) 93 Room Air  


 


9/14/17 15:44 36.4 92 16 138/83 (101) 98 Nasal Cannula 2.0 








Lab Results:





Results Past 24 Hours








Test


  9/14/17


16:00 9/14/17


16:55 9/14/17


17:09 9/14/17


20:22 Range/Units


 


 


Bedside Glucose 125 115  177 70-99  mg/dl


 


White Blood Count   9.02  4.8-10.8  K/uL


 


Red Blood Count   3.97  4.7-6.1  M/uL


 


Hemoglobin   11.3  14.0-18.0  g/dL


 


Hematocrit   34.9  42-52  %


 


Mean Corpuscular Volume   87.9    fL


 


Mean Corpuscular Hemoglobin   28.5  25-34  pg


 


Mean Corpuscular Hemoglobin


Concent 


  


  32.4


  


  32-36  g/dl


 


 


RDW Standard Deviation   42.2  36.4-46.3  fL


 


RDW Coefficient of Variation   13.0  11.5-14.5  %


 


Platelet Count   217  130-400  K/uL


 


Mean Platelet Volume   10.8  7.4-10.4  fL


 


Sodium Level   139  136-145  mmol/L


 


Potassium Level   4.2  3.5-5.1  mmol/L


 


Chloride Level   106    mmol/L


 


Carbon Dioxide Level   26  21-32  mmol/L


 


Anion Gap   7.0  3-11  mmol/L


 


Blood Urea Nitrogen   21  7-18  mg/dl


 


Creatinine


  


  


  1.40


  


  0.60-1.40


mg/dl


 


Est Creatinine Clear Calc


Drug Dose 


  


  50.1


  


   ml/min


 


 


Estimated GFR (


American) 


  


  58.2


  


   


 


 


Estimated GFR (Non-


American 


  


  50.2


  


   


 


 


BUN/Creatinine Ratio   15.1  10-20  


 


Random Glucose   111  70-99  mg/dl


 


Calcium Level   9.5  8.5-10.1  mg/dl


 


Magnesium Level   2.3  1.8-2.4  mg/dl


 


Troponin I   < 0.015  0-0.045  ng/ml


 


Test


  9/15/17


07:21 9/15/17


07:55 9/15/17


11:54 


  Range/Units


 


 


Bedside Glucose 67 93 184  70-99  mg/dl

## 2017-09-15 NOTE — PHARMACY PROGRESS NOTE
Glycemic: Assessment & Plan


Date of Service


Sep 15, 2017.





Assessment & Plan


The patient is currently receiving about 34 units of insulin per day.  BSGs 

ranging 67 - 177 mg/dl over the past 24hrs. 


BSG 67mg/dl this morning - treated with OJ. Will decrease dose of Lantus and 

loosen CF and CR to prevent further hypoglycemia.





Pt was to be discharged to [facility] yesterday, but was kept inpatient d/t 

orthostatic hypotension.














Test


  9/14/17


11:38 9/14/17


16:00 9/14/17


16:55 9/14/17


17:09


 


Bedside Glucose


  138 mg/dl


(70-99) 125 mg/dl


(70-99) 115 mg/dl


(70-99) 


 


 


Random Glucose


  


  


  


  111 mg/dl


(70-99)


 


Test


  9/14/17


20:22 9/15/17


07:21 


  


 


 


Bedside Glucose


  177 mg/dl


(70-99) 67 mg/dl


(70-99) 


  


 








* Basal insulin:             DECREASE: Lantus 20 units QHS





* Correctional Insulin:   Novolog Correction per scale ACHS


                         Goal Range: Low 110 mg/dL - High 140 mg/dL


                         CHANGE: Correction Factor: 30 mg/dL/unit





* Prandial insulin:        CHANGE: Per carb ratio of 1 unit per 10 grams CHO 

consumed








Pharmacy will continue to monitor patient daily and write orders per [facility] 

inpatient glycemic control protocol. Thanks.








* Please note that the plan above was derived based on current level of insulin 

resistance and hospital stress. These recommendations are appropriate for 

inpatient admission only. Plan of care upon discharge will need to be 

reassessed to avoid potential outpatient hypo/hyperglycemia.

## 2017-09-16 VITALS
DIASTOLIC BLOOD PRESSURE: 71 MMHG | TEMPERATURE: 98.96 F | HEART RATE: 97 BPM | OXYGEN SATURATION: 98 % | SYSTOLIC BLOOD PRESSURE: 136 MMHG

## 2017-09-16 VITALS
OXYGEN SATURATION: 95 % | SYSTOLIC BLOOD PRESSURE: 118 MMHG | TEMPERATURE: 98.78 F | HEART RATE: 61 BPM | DIASTOLIC BLOOD PRESSURE: 70 MMHG

## 2017-09-16 RX ADMIN — Medication SCH MG: at 07:48

## 2017-09-16 RX ADMIN — INSULIN ASPART SCH UNITS: 100 INJECTION, SOLUTION INTRAVENOUS; SUBCUTANEOUS at 08:30

## 2017-09-16 RX ADMIN — ATORVASTATIN CALCIUM SCH MG: 10 TABLET, FILM COATED ORAL at 07:48

## 2017-09-16 RX ADMIN — GABAPENTIN SCH MG: 100 CAPSULE ORAL at 07:48

## 2017-09-16 RX ADMIN — HEPARIN SODIUM SCH UNIT: 10000 INJECTION, SOLUTION INTRAVENOUS; SUBCUTANEOUS at 06:27

## 2017-09-16 RX ADMIN — FINASTERIDE SCH MG: 5 TABLET, FILM COATED ORAL at 07:48

## 2017-09-25 NOTE — DISCHARGE SUMMARY
Discharge Summary


Date of Service


Sep 25, 2017.





Discharge Summary


Admission Date:


Sep 11, 2017 at 16:04


Discharge Date:  Sep 15, 2017


Principal Diagnosis:


Uncontrolled DM-started on Insulin,BPH s/p Grady placement for retention


Secondary Diagnoses/Problems:


Please see H&P and Hospital progress note


Consultations:


Urology





Medication Reconciliation


New Medications:  


Finasteride (Finasteride) 5 Mg Tab


5 MG PO QAM for 30 Days, #30 TAB





Insulin Glargine (Lantus Solostar) 100 Unit/Ml Inj


20 UNITS SC HS for 30 Days, #1 PEN





Tamsulosin HCl (Tamsulosin HCl) 0.4 Mg Cap


0.4 MG PO HS for 30 Days, #30 CAP





 


Continued Medications:  


Aspirin Enteric Coated (Ecotrin Or Generic) 81 Mg Tab


81 MG PO QAM, TAB





Atorvastatin (Lipitor) 10 Mg Tab


10 MG PO DAILY, TAB





Gabapentin (Neurontin) 100 Mg Cap


100 MG PO DAILY, CAP





Lisinopril (Zestril) 20 Mg Tab


20 MG PO DAILY, TAB





 


Discontinued Medications:  


Glipizide Xl (Glucotrol Xl) 10 Mg Tab


10 MG PO QAM, TAB











Admission Information


HPI (per Admitting provider):


This is a 72 year old male with PMH of DM type 2, HTN, dyslipidemia, CKD stage 

III, and other problems listed below who was sent to the ED from [facility] 

center for hyperglycemia. BSG was in 500s PTA at surgical center, where he was 

supposed to have eye biopsy. Pt has not checked home BSG for several months. 

Patient was living alone, but has been staying with a friend for past 3 days. 

Has not taken glipizide since 3 days ago as he left it at home. Friend at 

bedside states he was eating (indiscretion with cookies, candy) at her home up 

until yesterday. Did not eat/ drink today due to being NPO for procedure. Pt 

reports nonproductive cough with nasal congestion and rhinorrhea x 1 week. Pt 

has been unable to void today- last voided yesterday evening. Ambulates with a 

cane. He fell out of bed overnight- denies any injury, head trauma, LOC. 

Chronic bilateral decreased vision is unchanged. Denies recent fever or chills. 

Denies headache, sinus pain, ear ache, sore throat, tooth ache, chest pain, SOB

, abdominal pain, N/V/D, dysuria, frequency, rash, open wound. The friend he is 

staying with has been ill with fever, vomiting, diarrhea. Pt's friend has 

concern about patient's ability to care for himself, and she is unable to care 

for him upon discharge. Patient's blood sugar was in 500s in ED. Received 10 

units of regular insulin SC and BSG 1 hour later was 490. Per ER RN, patient 

was placed on oxygen 2 liters NC while she was on break, then when she removed 

the O2, patient was hypoxic to high 80's. Now saturating well on 2 liters NC. 

No home O2 use. Denies hx of lung disease. 








Past Medical/Surgical History


Medical Problems:


(1) Benign hypertension


Status: Chronic  





(2) Cellulitis of leg


Status: Resolved  





(3) CKD (chronic kidney disease), stage III


Status: Chronic  





(4) Diabetes mellitus type 2


Status: Chronic  





(5) GERD (gastroesophageal reflux disease)


Status: Chronic  





(6) Hyperlipidemia


Status: Chronic  





Surgical Problems:


(1) H/O skin graft


Status: Chronic  





(2) S/P tonsillectomy


Status: Chronic  

















Family History





Diabetes mellitus


  SISTER


Hypertension


  MOTHER





Social History


Smoking Status:  Never Smoker


Alcohol Use:  none (denies alcohol use. of note, alcohol abuse is listed in 

outpatient records. )


Drug Use:  none


Marital Status:  


Housing status:  lives alone (staying with friend past few days)


Occupational Status:  retired





Immunizations


History of Influenza Vaccine:  Yes


Influenza Vaccine Date:  Sep 19, 2012


History of Tetanus Vaccine?:  Unknown


History of Pneumococcal:  Yes


Pneumococcal Date:  Sep 30, 2012


History of Hepatitis B Vaccine:  No





Multi-Drug Resistant Organisms


History of MDRO:  No





Allergies


Coded Allergies:  


     No Known Allergies (Unverified , 9/11/17)





Home Medications


Scheduled


Aspirin Enteric Coated (Ecotrin Or Generic), 81 MG PO QAM


Atorvastatin (Lipitor), 10 MG PO DAILY


Gabapentin (Neurontin), 100 MG PO DAILY


Glipizide Xl (Glucotrol Xl), 10 MG PO QAM


Lisinopril (Zestril), 20 MG PO DAILY





Review of Systems


Ten systems reviewed and negative except as noted in HPI.





Physical Exam


Vital Signs











  Date Time  Temp Pulse Resp B/P (MAP) Pulse Ox O2 Delivery O2 Flow Rate FiO2


 


9/11/17 16:11  89 18 134/83 98  2.0 


 


9/11/17 14:15  95 16 133/77 90   


 


9/11/17 12:38  81      


 


9/11/17 12:22 36.7 90 16 134/81 99   








General Appearance:  WD/WN, no apparent distress


Head:  normocephalic, atraumatic


Eyes:  + pertinent finding (right cornea appears opaque, left pupil reactive to 

light)


ENT:  hearing grossly normal, TMs normal, pharynx normal, + pertinent finding (

almost edentulous, existing few teeth are in poor condition but no abscesses 

seen, no gum or tooth tenderness to palpation)


Neck:  supple, trachea midline


Respiratory/Chest:  lungs clear, normal breath sounds, no respiratory distress, 

no accessory muscle use, + pertinent finding (saturating well on 2 liters NC)


Cardiovascular:  regular rate, rhythm, no murmur


Abdomen/GI:  normal bowel sounds, non tender, soft


Extremities/Musculoskelatal:  no calf tenderness, no pedal edema, + pertinent 

finding (medial right chronic bony deformity. toenails are in poor repair, 

especially right 1st and 2nd digit. right 2nd toe mild erythema of entire toe, 

no swelling or tenderness.)


Neurologic/Psych:  alert, normal mood/affect, oriented x 3, + pertinent finding 

(sensation to light touch intact on bilateral feet)


Skin:  normal color, warm/dry





Diagnostics


Laboratory Results





Results Past 24 Hours








Test


  9/11/17


12:23 9/11/17


13:45 9/11/17


13:53 9/11/17


15:18 Range/Units


 


 


Bedside Glucose 576   491 70-99  mg/dl


 


White Blood Count  13.08   4.8-10.8  K/uL


 


Red Blood Count  3.79   4.7-6.1  M/uL


 


Hemoglobin  10.9   14.0-18.0  g/dL


 


Hematocrit  33.1   42-52  %


 


Mean Corpuscular Volume  87.3     fL


 


Mean Corpuscular Hemoglobin  28.8   25-34  pg


 


Mean Corpuscular Hemoglobin


Concent 


  32.9


  


  


  32-36  g/dl


 


 


Platelet Count  196   130-400  K/uL


 


Mean Platelet Volume  10.8   7.4-10.4  fL


 


Neutrophils (%) (Auto)  78.2    %


 


Lymphocytes (%) (Auto)  16.1    %


 


Monocytes (%) (Auto)  4.7    %


 


Eosinophils (%) (Auto)  0.6    %


 


Basophils (%) (Auto)  0.2    %


 


Neutrophils # (Auto)  10.23   1.4-6.5  K/uL


 


Lymphocytes # (Auto)  2.11   1.2-3.4  K/uL


 


Monocytes # (Auto)  0.61   0.11-0.59  K/uL


 


Eosinophils # (Auto)  0.08   0-0.5  K/uL


 


Basophils # (Auto)  0.02   0-0.2  K/uL


 


RDW Standard Deviation  42.5   36.4-46.3  fL


 


RDW Coefficient of Variation  13.2   11.5-14.5  %


 


Immature Granulocyte % (Auto)  0.2    %


 


Immature Granulocyte # (Auto)  0.03   0.00-0.02  K/uL


 


Venous Blood pH   7.38  7.36-7.41  


 


Venous Blood Partial Pressure


CO2 


  


  44


  


  38.0-50.0  mmHg


 


 


Venous Blood Partial Pressure


O2 


  


  32


  


   mmHg


 


 


Venous Blood HCO3   25   mmol/L


 


Venous Blood Oxygen Saturation   60.5   %


 


Venous Blood Base Excess   0.1   mEq/L


 


Sodium Level   133  136-145  mmol/L


 


Potassium Level   5.0  3.5-5.1  mmol/L


 


Chloride Level   101    mmol/L


 


Carbon Dioxide Level   25  21-32  mmol/L


 


Anion Gap   7.0  3-11  mmol/L


 


Blood Urea Nitrogen   37  7-18  mg/dl


 


Creatinine


  


  


  2.00


  


  0.60-1.40


mg/dl


 


Est Creatinine Clear Calc


Drug Dose 


  


  35.5


  


   ml/min


 


 


Estimated GFR (


American) 


  


  37.8


  


   


 


 


Estimated GFR (Non-


American 


  


  32.6


  


   


 


 


BUN/Creatinine Ratio   18.6  10-20  


 


Random Glucose   542  70-99  mg/dl


 


Calcium Level   9.1  8.5-10.1  mg/dl


 


Total Bilirubin   0.5  0.2-1  mg/dl


 


Direct Bilirubin   0.2  0-0.2  mg/dl


 


Aspartate Amino Transf


(AST/SGOT) 


  


  9


  


  15-37  U/L


 


 


Alanine Aminotransferase


(ALT/SGPT) 


  


  18


  


  12-78  U/L


 


 


Alkaline Phosphatase   63    U/L


 


Total Protein   7.0  6.4-8.2  gm/dl


 


Albumin   3.8  3.4-5.0  gm/dl


 


Lipase   198    U/L


 


Beta-Hydroxybutyric Acid   4.08  0.2-2.81  mg/dL


 


Test


  9/11/17


16:04 9/11/17


16:06 


  


  Range/Units


 











EKG


NSR, 84 bpm, no ST or T wave abnormality





Impression


Assessment and Plan





HYPERGLYCEMIA


In setting of DM type 2, uncontrolled per last A1c (9.6 in 2/2017)


Likely secondary to medication non-compliance, dietary indiscretion, rule out 

infection- afebrile, +mild leukocytosis (WBC 13K) f/u CXR and UA


AG normal, not in DKA


Hold glipizide


Start insulin drip and monitor electrolytes


Consult pharmacy for glycemic control 


Check A1c


Diabetes educator consult, also recommend podiatry referral as outpatient





HYPOXIA


Was placed on O2 nasal cannula in ER, was hypoxic to high 80's upon removal by 

ER RN


Not on home O2- check CXR to r/o PNA given leukocytosis and c/o cough


Continue supplemental O2 per protocol 





DEANNE ON CKD STAGE III


Creat is 2.0, baseline 1.2


Possibly secondary to dehydration; denies NSAID use


Hold lisinopril


Received 1 liter IVF's in ER- continue at 125 cc/hour


Monitor renal function


Avoid nephrotoxins when able 





URINARY RETENTION


Unable to void today, bladder scan + urinary retention in ER- 1450 mL output on 

straight cath 


UA pending


Bladder scan and straight cath PRN





CHRONIC ANEMIA


Hg is 10.9, was 11's back in 2013


Follow up as outpatient





DYSLIPIDEMIA


Continue statin





DVT PROPHYLAXIS


Heparin SQ





CODE STATUS


Full code per my discussion with the patient





DISPOSITION


Admission telemetry


May need placement, lives alone, friend concerned he is unable to care for 

himself at home, and she is unable to care for him after discharge


PT, OT, social service evaluation


Follows with Dr. Stephanie Covarrubias for primary care





Patient seen in collaboration with Dr. Pradhan. Please see his addendum.





VTE Prophylaxis


VTE Risk Assessment Done? Y/N:  Yes


Risk Level:  Moderate





Note








ATTENDING ADDENDUM





Record reviewed.


Patient interviewed and examined in ED.


Care coordinated with Madina Ramos PA-C.


Please refer to her documentation for patient's history.


Briefly, 71 YO male with history of DM type 2, managed with glipizide.


Has not take his glipizide for a few days.


Had outpatient eye surgery planned for today; found to have blood sugar > 500 

and was referred to ED.


Pt notes blurred vision. Denies polyuria or polydipsia.





EXAM:





General-  appears to be chronically ill, no acute distress


VS- as noted


HEENT-  anicteric


Neck-  no JVD


Lungs-  clear


Heart-  RRR, II/VI sys murmur at base


Abdomen-  + BS, soft, nontender, no masses  or hepatosplenomegaly


Rectal- prostate enlarged, nontender; no masses appreciated


Extremities-  no pretibial edema or calf tenderness; pedal pulses intact; no 

diabetic foot lesions


Neuro-  alert








DATA:





Random blood sugar 576.


BUN 37, creat 2.0. Na 133. K 530. AG 7.0. BHG 4.08


UA 3+ glucose, otherwise negative.


Other lab studies as noted.





CXR reviewed- emphysematous changes, no infiltrates, effusions, CHF.





EKG performed at 12:22 reviewed and demonstrated NSR at 84 / minute, no acute 

ST or T-wave changes..








ASSESSMENT AND PLAN:





DM TYPE 2, POORLY CONTROLLED


Sounds like chronic problems with glycemic management.


Has not taken glipizide for past few days.


Has tried metformin in the past, but apparently unable to take it.


Not a candidate for insulin therapy.


Started on insulin infusion in ED.


Check Hgb A1C.


Pharmacy consulted for glycemic management.





ACUTE KIDNEY INJURY


Serum creatinine 2, compared to 1.26 in 2015.


Acute kidney injury could be secondary to hyperglycemia or obstructive uropathy.


Hold lisinopril, then restart at reduced dose with caution if creatinine 

improves.


Check renal US.





URINARY RETENTION


Patient reports that he typically only voids 1-3 times a day.


Bladder scan in ED revealed bladder volume > 999 ml.


Straight cath --> 1400 ml.


Denies urgency, frequency, nocturia, dysuria, hematuria.


Rectal exam reveals enlarged prostate.


Straight cath PRN.


Start tamsulosin if BP's OK.


Consult Urology.





Please refer to MATT Ramos's documentation for discussion of other issues.





Dony Pradhan MD


.











<Electronically signed by Madina Ramos PA-C>


<Electronically signed by Dony Pradhan M.D.>


  Signed:  09/11/17 1715


  Signed:  09/11/17 2125





The status of this report is Signed


* If report status is Draft, the document has not been finalized by the 

responsible provider.  


MNE: [facility] History and Physical Template


<AttendingPhy>Neeta Leon M.D.</AttendingPhy><EDPhy>Reardon, Brandon M., DO</

EDPhy> <FamilyPhy>Stephanie Covarrubias D.O.</FamilyPhy> <PrimaryPhy>Stephanie Covarrubias D.O.</PrimaryPhy><UnitNumber>[id number]</UnitNumber><VisitNumber>[id number]<

/VisitNumber><PatientName>DORIS MCKNIGHT</PatientName><DateOfBirth>1945</

DateOfBirth><Age>72</Age><Location>[facility]</Location><ServiceDate>09/11/17</

ServiceDate><CC>Dony Pradhan M.D. Kopinski, Laura, D.O. McPhail, Jennifer A., PA-C</CC><MNE>ACUTEMED</MNE>











Addendum:  09/11/17 2127





Addendum: Dony Pradhan M.D. on 9/11/17 @ 21:27


Addendum Section





Blood sugars in ED initially > 500.


Started on insulin infusion.


Blood sugar fell to 68 by 19:25.


Insulin infusion held.


Pharmacy handling glycemic management.


NovoLog coverage ordered for tonight.


.








<Electronically signed by Dony Pradhan M.D.>   09/11/17 2127


Physical Exam (per Admitting):  


   General Appearance:  WD/WN, no apparent distress


   Head:  normocephalic, atraumatic


   Eyes:  + pertinent finding (right cornea appears opaque, left pupil reactive 

to light)


   ENT:  hearing grossly normal, TMs normal, pharynx normal, + pertinent finding

 (almost edentulous, existing few teeth are in poor condition but no abscesses 

seen, no gum or tooth tenderness to palpation)


   Neck:  supple, trachea midline


   Respiratory/Chest:  lungs clear, normal breath sounds, no respiratory 

distress, no accessory muscle use, + pertinent finding (saturating well on 2 

liters NC)


   Cardiovascular:  regular rate, rhythm, no murmur


   Abdomen/GI:  normal bowel sounds, non tender, soft


   Extremities/Musculoskelatal:  no calf tenderness, no pedal edema, + 

pertinent finding (medial right chronic bony deformity. toenails are in poor 

repair, especially right 1st and 2nd digit. right 2nd toe mild erythema of 

entire toe, no swelling or tenderness.)


   Neurologic/Psych:  alert, normal mood/affect, oriented x 3, + pertinent 

finding (sensation to light touch intact on bilateral feet)


   Skin:  normal color, warm/dry





Hospital Course





Orthostatic Hypotension


Significant drop in SBP but no change in Pulse and or symptoms


Ambulation well 


No desaturation


Will discharge 











Uncontrolled Diabetes


Potential to get worse with DKA/Hyperosmolar state 


Last HbA1c A1c (9.6 in 2/2017)


Likely secondary to medication non-compliance, dietary indiscretion, rule out 

infection- afebrile, +mild leukocytosis (WBC 13K) f/u CXR and UA


Started on  insulin drip and monitor electrolytes


Consult pharmacy for glycemic control-appreciate input 


Check E1l-[id number] at 15


Diabetes educator consult, also recommend podiatry referral as outpatient


Discharge to [facility] on Insulin today





HYPOXIA-without any definite cause 


Was placed on O2 nasal cannula in ER, was hypoxic to high 80's upon removal by 

ER RN


Not on home O2- check CXR to r/o PNA given leukocytosis and c/o cough


Continue supplemental O2 per protocol 


Resolved 


Ambulating without any drop in Saturation





DEANNE ON CKD STAGE III


Creat is 2.0, baseline 1.2


Possibly secondary to dehydration; 


No h/o NSAID use 


Hold lisinopril


Received 1 liter IVF's in ER- continue at 125 cc/hour


Avoid nephrotoxins when able 


Creatinine improving


Will restart Lisinopril





URINARY RETENTION


Unable to void today, bladder scan + urinary retention in ER- 1450 mL output on 

straight cath 


UA pending-negative


Likely secondary to BPH 


Bladder scan and straight cath PRN


Urology consulted-appreciate Input


S/P Grady placement and Flomax added


Will need to see Urology in 1 week








CHRONIC ANEMIA


Hg is 10.9, was 11's back in 2013


Follow up as outpatient





DYSLIPIDEMIA


Continue statin





DVT PROPHYLAXIS


Heparin SQ





CODE STATUS


Full code per my discussion with the patient





DISPOSITION


Admission telemetry


May need placement, lives alone, friend concerned he is unable to care for 

himself at home, and she is unable to care for him after discharge


PT, OT, social service evaluation


Follows with Dr. Stephanie Covarrubias for primary care


Discharge to [facility] today 9/15/17


Total time spent on discharge = 35 minutes


This includes examination of the patient, discharge planning, medication 

reconciliation, and communication with other providers.





Discharge Instructions


Date of Service


Sep 14, 2017.





Admission


Reason for Admission:  Acute Kidney Injury, Hyperglycemia





Discharge


Discharge Diagnosis / Problem:  Uncontrolled DM-started on Insulin,BPH s/p 

Grady placement





Discharge Goals


Goal(s):  Prevent Disease Progression





Activity Recommendations


Activity Level:  Assistance Required


Therapies:  Physical Therapy, Occupational Therapy





.





Additional Information


Patient informed of condition:  Yes


Advance Directives:  No


DNR:  No


Level of Care:  Skilled


Communicable Disease:  No


Prognosis:  Stable


Oxygen at (LPM):  2 liters/min via NC as needed


Grady Catheter:  Yes (Please make an appointment with Dr Greer in 1 week)





Instructions / Follow-Up


Instructions / Follow-Up


Dr Greer (Urology ) in 1 week.Pl make an appointment with your PCP within 1 

week following discharge from the facility





Current Hospital Diet


Patient's current hospital diet: Diabetes Type 2 Diet, AHA Diet (Heart Healthy)





Discharge Diet


Recommended Diet:  AHA Diet (Heart Healthy), Diabetes Type 2 Diet





Pending Studies


Studies pending at discharge:  no





Physician Orders On Transfer


POLST Discussion:  Not Applicable





Laboratory Results





Hemoglobin A1c








Test


  9/12/17


05:37 Range/Units


 


 


Estimated Average Glucose 384   mg/dl


 


Hemoglobin A1c 15.0 H 4.5-5.6  %











Medical Emergencies








.


Who to Call and When:





Medical Emergencies:  If at any time you feel your situation is an emergency, 

please call 911 immediately.





.





Non-Emergent Contact


Non-Emergency issues call your:  Primary Care Provider





.





Past History


Medical & Surgical History:  


(1) BPH (benign prostatic hyperplasia)


(2) Benign hypertension


(3) Diabetes mellitus type 2


(4) GERD (gastroesophageal reflux disease)


(5) DEANNE (acute kidney injury)


(6) S/P tonsillectomy


(7) H/O skin graft


.








"Provider Documentation" section prepared by Neeta Leon.








.





Core Measure Problem


Core Measures:  None











<Electronically signed by Neeta Leon M.D.>





  Signed:  09/14/17 3038





Additional Copies To


Stephanie Covarrubias D.O.

## 2017-11-06 ENCOUNTER — HOSPITAL ENCOUNTER (OUTPATIENT)
Dept: HOSPITAL 45 - C.LABSPEC | Age: 72
Discharge: NURSING, REHAB OR LONG-TERM CARE | End: 2017-11-06
Attending: INTERNAL MEDICINE
Payer: COMMERCIAL

## 2017-11-06 DIAGNOSIS — N18.9: Primary | ICD-10-CM

## 2017-11-06 LAB
ANION GAP SERPL CALC-SCNC: 6 MMOL/L (ref 3–11)
BUN SERPL-MCNC: 33 MG/DL (ref 7–18)
BUN/CREAT SERPL: 17.9 (ref 10–20)
CALCIUM SERPL-MCNC: 9 MG/DL (ref 8.5–10.1)
CHLORIDE SERPL-SCNC: 104 MMOL/L (ref 98–107)
CO2 SERPL-SCNC: 26 MMOL/L (ref 21–32)
CREAT SERPL-MCNC: 1.85 MG/DL (ref 0.6–1.4)
GLUCOSE SERPL-MCNC: 209 MG/DL (ref 70–99)
POTASSIUM SERPL-SCNC: 5.2 MMOL/L (ref 3.5–5.1)
SODIUM SERPL-SCNC: 136 MMOL/L (ref 136–145)

## 2017-11-16 ENCOUNTER — HOSPITAL ENCOUNTER (INPATIENT)
Dept: HOSPITAL 45 - C.EDC | Age: 72
LOS: 4 days | Discharge: NURSING, REHAB OR LONG-TERM CARE | DRG: 872 | End: 2017-11-20
Attending: HOSPITALIST | Admitting: HOSPITALIST
Payer: COMMERCIAL

## 2017-11-16 VITALS
HEIGHT: 72 IN | BODY MASS INDEX: 23.74 KG/M2 | HEIGHT: 72 IN | WEIGHT: 175.27 LBS | BODY MASS INDEX: 23.74 KG/M2 | WEIGHT: 175.27 LBS | BODY MASS INDEX: 23.74 KG/M2

## 2017-11-16 VITALS
OXYGEN SATURATION: 96 % | HEART RATE: 99 BPM | TEMPERATURE: 99.32 F | SYSTOLIC BLOOD PRESSURE: 115 MMHG | DIASTOLIC BLOOD PRESSURE: 67 MMHG

## 2017-11-16 VITALS
HEART RATE: 79 BPM | DIASTOLIC BLOOD PRESSURE: 68 MMHG | SYSTOLIC BLOOD PRESSURE: 110 MMHG | OXYGEN SATURATION: 96 % | TEMPERATURE: 98.42 F

## 2017-11-16 VITALS — OXYGEN SATURATION: 96 %

## 2017-11-16 VITALS — OXYGEN SATURATION: 95 %

## 2017-11-16 DIAGNOSIS — Z87.891: ICD-10-CM

## 2017-11-16 DIAGNOSIS — E78.5: ICD-10-CM

## 2017-11-16 DIAGNOSIS — A41.9: Primary | ICD-10-CM

## 2017-11-16 DIAGNOSIS — Z82.49: ICD-10-CM

## 2017-11-16 DIAGNOSIS — N18.3: ICD-10-CM

## 2017-11-16 DIAGNOSIS — Z79.82: ICD-10-CM

## 2017-11-16 DIAGNOSIS — K21.9: ICD-10-CM

## 2017-11-16 DIAGNOSIS — Z83.3: ICD-10-CM

## 2017-11-16 DIAGNOSIS — I12.9: ICD-10-CM

## 2017-11-16 DIAGNOSIS — N39.0: ICD-10-CM

## 2017-11-16 DIAGNOSIS — N40.0: ICD-10-CM

## 2017-11-16 DIAGNOSIS — D64.9: ICD-10-CM

## 2017-11-16 DIAGNOSIS — A49.01: ICD-10-CM

## 2017-11-16 DIAGNOSIS — E11.9: ICD-10-CM

## 2017-11-16 LAB
ALP SERPL-CCNC: 98 U/L (ref 45–117)
ALT SERPL-CCNC: 19 U/L (ref 12–78)
ANION GAP SERPL CALC-SCNC: 7 MMOL/L (ref 3–11)
ANION GAP SERPL CALC-SCNC: 8 MMOL/L (ref 3–11)
APPEARANCE UR: (no result)
AST SERPL-CCNC: 16 U/L (ref 15–37)
B-OH-BUTYR SERPL-SCNC: 2.43 MG/DL (ref 0.2–2.81)
BASOPHILS # BLD: 0.02 K/UL (ref 0–0.2)
BASOPHILS NFR BLD: 0.1 %
BILIRUB UR-MCNC: (no result) MG/DL
BUN SERPL-MCNC: 40 MG/DL (ref 7–18)
BUN SERPL-MCNC: 42 MG/DL (ref 7–18)
BUN/CREAT SERPL: 18.6 (ref 10–20)
BUN/CREAT SERPL: 18.7 (ref 10–20)
CALCIUM SERPL-MCNC: 7.8 MG/DL (ref 8.5–10.1)
CALCIUM SERPL-MCNC: 8.1 MG/DL (ref 8.5–10.1)
CHLORIDE SERPL-SCNC: 102 MMOL/L (ref 98–107)
CHLORIDE SERPL-SCNC: 105 MMOL/L (ref 98–107)
CO2 SERPL-SCNC: 23 MMOL/L (ref 21–32)
CO2 SERPL-SCNC: 24 MMOL/L (ref 21–32)
COLOR UR: YELLOW
COMPLETE: YES
CREAT CL PREDICTED SERPL C-G-VRATE: 31.3 ML/MIN
CREAT CL PREDICTED SERPL C-G-VRATE: 32 ML/MIN
CREAT SERPL-MCNC: 2.17 MG/DL (ref 0.6–1.4)
CREAT SERPL-MCNC: 2.22 MG/DL (ref 0.6–1.4)
EOSINOPHIL NFR BLD AUTO: 227 K/UL (ref 130–400)
EOSINOPHIL NFR BLD AUTO: 265 K/UL (ref 130–400)
FLUAV RNA SPEC QL NAA+PROBE: (no result)
FLUBV RNA SPEC QL NAA+PROBE: (no result)
GLUCOSE SERPL-MCNC: 221 MG/DL (ref 70–99)
GLUCOSE SERPL-MCNC: 322 MG/DL (ref 70–99)
HCT VFR BLD CALC: 24.2 % (ref 42–52)
HCT VFR BLD CALC: 25.5 % (ref 42–52)
HYPOCHROMIA BLD QL SMEAR: PRESENT
IG%: 0.6 %
IMM GRANULOCYTES NFR BLD AUTO: 1.8 %
INR PPP: 1.1 (ref 0.9–1.1)
LYMPHOCYTES # BLD: 0.47 K/UL (ref 1.2–3.4)
MAGNESIUM SERPL-MCNC: 2 MG/DL (ref 1.8–2.4)
MANUAL MICROSCOPIC REQUIRED?: NO
MCH RBC QN AUTO: 26.6 PG (ref 25–34)
MCH RBC QN AUTO: 27.2 PG (ref 25–34)
MCHC RBC AUTO-ENTMCNC: 30.6 G/DL (ref 32–36)
MCHC RBC AUTO-ENTMCNC: 31.4 G/DL (ref 32–36)
MCV RBC AUTO: 86.7 FL (ref 80–100)
MCV RBC AUTO: 87 FL (ref 80–100)
MONOCYTES NFR BLD: 4.3 %
NEUTROPHILS # BLD AUTO: 0.1 %
NEUTROPHILS NFR BLD AUTO: 93.1 %
NITRITE UR QL STRIP: (no result)
PARTIAL THROMBOPLASTIN RATIO: 1.1
PH UR STRIP: 5 [PH] (ref 4.5–7.5)
PLATELET # BLD EST: NORMAL 10*3/UL
PMV BLD AUTO: 8.6 FL (ref 7.4–10.4)
PMV BLD AUTO: 9.6 FL (ref 7.4–10.4)
POTASSIUM SERPL-SCNC: 4.8 MMOL/L (ref 3.5–5.1)
POTASSIUM SERPL-SCNC: 5.1 MMOL/L (ref 3.5–5.1)
PROTHROMBIN TIME: 11.4 SECONDS (ref 9–12)
RBC # BLD AUTO: 2.79 M/UL (ref 4.7–6.1)
RBC # BLD AUTO: 2.93 M/UL (ref 4.7–6.1)
REVIEW REQ?: YES
SODIUM SERPL-SCNC: 133 MMOL/L (ref 136–145)
SODIUM SERPL-SCNC: 136 MMOL/L (ref 136–145)
SP GR UR STRIP: 1.01 (ref 1–1.03)
TSH SERPL-ACNC: 3.27 UIU/ML (ref 0.3–4.5)
URINE BILL WITH OR WITHOUT MIC: (no result)
URINE EPITHELIAL CELL AUTO: (no result) /LPF (ref 0–5)
UROBILINOGEN UR-MCNC: (no result) MG/DL
WBC # BLD AUTO: 20.66 K/UL (ref 4.8–10.8)
WBC # BLD AUTO: 25.75 K/UL (ref 4.8–10.8)

## 2017-11-16 RX ADMIN — ALBUTEROL SULFATE SCH PUFFS: 90 AEROSOL, METERED RESPIRATORY (INHALATION) at 21:37

## 2017-11-16 RX ADMIN — TAMSULOSIN HYDROCHLORIDE SCH MG: 0.4 CAPSULE ORAL at 21:37

## 2017-11-16 RX ADMIN — ALBUTEROL SULFATE SCH PUFFS: 90 AEROSOL, METERED RESPIRATORY (INHALATION) at 17:17

## 2017-11-16 RX ADMIN — INSULIN ASPART SCH UNITS: 100 INJECTION, SOLUTION INTRAVENOUS; SUBCUTANEOUS at 17:19

## 2017-11-16 RX ADMIN — INSULIN ASPART SCH UNITS: 100 INJECTION, SOLUTION INTRAVENOUS; SUBCUTANEOUS at 21:00

## 2017-11-16 RX ADMIN — HEPARIN SODIUM SCH UNIT: 10000 INJECTION, SOLUTION INTRAVENOUS; SUBCUTANEOUS at 21:38

## 2017-11-16 NOTE — EMERGENCY ROOM VISIT NOTE
History


Report prepared by Lazaro:  Luis Rene


Under the Supervision of:  Dr. Dony Rodriguez M.D.


First contact with patient:  11:17


Chief Complaint:  WEAKNESS


Stated Complaint:  ILLNESS/POSS UTI





History of Present Illness


The patient is a 72 year old male who presents to the Emergency Room with 

complaints of persistent weakness that began several days ago.  The patient 

states that he had a Grady catheter placed at his nursing home.  Per nursing 

notes that the patient arrives via ALS from [facility].  Nursing 

notes report that the patient has had penile discharge and urinary symptoms.  

Nursing staff notes that the patient had a Grady catheter placed 1 month ago 

and had it changed out two days ago.  Nursing notes report that the patient has 

been weak, dizzy, and disoriented.  The patient states that he fell today and 

hit his head.  He states that his breathing has been okay and notes no 

difference with the nasal cannula oxygen.  Nursing notes report that the 

patient had a lowgrade fever on Tuesday.  Pt denies LOC, headache, chills, 

diaphoresis, visual changes, neck pain, chest pain, nausea, vomiting, abdominal 

pain, back pain,  numbness, lymphadenopathy, rash, or other complaints.





   Source of History:  patient


   Onset:  several days ago


   Position:  other (global)


   Quality:  other (weakness)


   Timing:  other (persistent)


   Associated Symptoms:  + fevers


Note:


Associated symptoms: dizzy, disoriented





Review of Systems


See HPI for pertinent positives and negatives.  A total of ten systems were 

reviewed and were otherwise negative.





Past Medical & Surgical


Medical Problems:


(1) Benign hypertension


(2) BPH (benign prostatic hyperplasia)


(3) CKD (chronic kidney disease), stage III


(4) Diabetes mellitus type 2


(5) GERD (gastroesophageal reflux disease)


(6) Hyperlipidemia


Surgical Problems:


(1) H/O skin graft


(2) S/P tonsillectomy








Family History





Diabetes mellitus


  SISTER


Hypertension


  MOTHER





Social History


Smoking Status:  Former Smoker


Alcohol Use:  none


Drug Use:  none


Marital Status:  


Housing Status:  lives alone


Occupation Status:  retired





Current/Historical Medications


Scheduled


Albuterol Sulfate (Proair Respiclick), 2 PUFFS INH QID


Amlodipine (Norvasc), 5 MG PO DAILY


Aspirin Enteric Coated (Ecotrin Or Generic), 81 MG PO QAM


Atorvastatin (Lipitor), 10 MG PO DAILY


Finasteride (Finasteride), 5 MG PO QAM


Gabapentin (Neurontin), 100 MG PO DAILY


Insulin Glargine (Lantus), 40 UNITS SC DAILY


Tamsulosin HCl (Tamsulosin HCl), 0.4 MG PO HS


Zinc Oxide (Topical) (Desitin), 1 APPLN TOP HS





Scheduled PRN


Acetaminophen (Tylenol), 500 MG PO Q4 PRN for Pain


Insulin Human NPH (Humulin N), SC UD PRN for SLIDING SCALE


Polyethylene Glycol 3350 (Qc Natura-Lax), 17 GM PO DAILY PRN for Constipation





Allergies


Coded Allergies:  


     No Known Allergies (Unverified , 11/16/17)





Physical Exam


Vital Signs











  Date Time  Temp Pulse Resp B/P (MAP) Pulse Ox O2 Delivery O2 Flow Rate FiO2


 


11/16/17 13:31    109/67    


 


11/16/17 13:30     96 Nasal Cannula 2.0 


 


11/16/17 13:30  102 18  96 Nasal Cannula 2.0 


 


11/16/17 13:00  102 18 108/63 98 Nasal Cannula 2.0 


 


11/16/17 12:30  103 18 103/65 97 Nasal Cannula 2.0 


 


11/16/17 12:19  104 18 109/65 96 Nasal Cannula 2.0 


 


11/16/17 12:18    109/65    


 


11/16/17 11:30  108 18 108/66 97 Nasal Cannula 2.0 


 


11/16/17 11:30  108 18 108/66 97   


 


11/16/17 11:10    96/63    


 


11/16/17 11:10  109      


 


11/16/17 11:01     95 Nasal Cannula 2.0 


 


11/16/17 11:00     87 Room Air  


 


11/16/17 11:00 37.4 110 18 96/63 87 Room Air  


 


11/16/17 11:00 37.4 110 18 96/63 87 Room Air  











Physical Exam


GENERAL: Awake, alert, well-appearing, in no distress


HENT: Mild occipital contusion. Oropharynx unremarkable.


EYES: Normal conjunctiva. Sclera non-icteric. Cornea opacified on the right


NECK: Supple. No nuchal rigidity. FROM. No JVD.


RESPIRATORY: Clear to auscultation.


CARDIAC: Borderline tachycardic rate, normal rhythm. Extremities warm and well 

perfused. Pulses equal.


ABDOMEN: Soft, non-distended. No tenderness to palpation. No rebound or 

guarding. No masses.


: Grady catheter in place, normal male genitalia otherwise.


RECTAL: Deferred.


MUSCULOSKELETAL: Chest examination reveals no tenderness. The back is 

symmetrical on inspection without obvious abnormality.  No joint edema. 


LOWER EXTREMITIES: Calves are equal size bilaterally and non-tender. No edema. 

No discoloration. 


NEURO: Normal sensorium. No sensory or motor deficits noted. 


SKIN: No rash or jaundice noted.





Medical Decision & Procedures


ER Provider


Diagnostic Interpretation:


Radiology results as stated below per my review and radiologist interpretation: 








CHEST ONE VIEW PORTABLE





CLINICAL HISTORY: Weakness    





COMPARISON STUDY:  9/11/2017





FINDINGS: The heart is normal in size. There is aortic tortuosity/ectasia. There


is diffuse elevation of the interstitium. Pulmonary vascular congestion is


suspected. A bilateral interstitial inflammatory process could appear similar


but is felt to be less likely. Clinical and radiographic follow-up is


recommended. There are no pleural effusions.[ 





IMPRESSION: Diffuse elevation of the interstitium. This likely represents


pulmonary vascular congestion although a bilateral interstitial inflammatory


process could appear similar. Clinical and radiographic follow-up is


recommended.





Electronically signed by:  Ed Singh M.D.


11/16/2017 12:08 PM





Dictated Date/Time:  11/16/2017 12:07 PM








CT HEAD WITHOUT CONTRAST (CT)





CLINICAL HISTORY: fall, confusion HEAD PAIN





COMPARISON STUDY:  No previous studies for comparison.





TECHNIQUE:  Axial CT of the brain is performed from the vertex to the skull


base. IV contrast was not administered for this examination. A dose lowering


technique was utilized adhering to the principles of ALARA.


 





CT DOSE: 614.27 mGy.cm





FINDINGS:





No intra or extra-axial mass lesions are visualized. There is no CT evidence of


acute cortical infarction. There is no evidence of midline shift. There is no


acute  hemorrhage. No calvarial fractures are visualized. 


There are minimal white matter hypodensities likely on a small vessel basis.


There is basal ganglial mineralization.


There is no evidence of pathologic ventricular dilatation.


There is no evidence of acute sinusitis





IMPRESSION: No acute intracranial findings





Electronically signed by:  Ed Singh M.D.


11/16/2017 12:06 PM





Dictated Date/Time:  11/16/2017 12:05 PM





Laboratory Results











Test


  11/16/17


10:44 11/16/17


11:45


 


Immature Granulocyte % (Auto) 0.6 %  


 


White Blood Count


  25.75 K/uL


(4.8-10.8) 


 


 


Red Blood Count


  2.93 M/uL


(4.7-6.1) 


 


 


Hemoglobin


  7.8 g/dL


(14.0-18.0) 


 


 


Hematocrit 25.5 % (42-52)  


 


Mean Corpuscular Volume


  87.0 fL


() 


 


 


Mean Corpuscular Hemoglobin


  26.6 pg


(25-34) 


 


 


Mean Corpuscular Hemoglobin


Concent 30.6 g/dl


(32-36) 


 


 


Platelet Count


  265 K/uL


(130-400) 


 


 


Mean Platelet Volume


  9.6 fL


(7.4-10.4) 


 


 


Neutrophils (%) (Auto) 93.1 %  


 


Lymphocytes (%) (Auto) 1.8 %  


 


Monocytes (%) (Auto) 4.3 %  


 


Eosinophils (%) (Auto) 0.1 %  


 


Basophils (%) (Auto) 0.1 %  


 


Neutrophils # (Auto)


  23.97 K/uL


(1.4-6.5) 


 


 


Lymphocytes # (Auto)


  0.47 K/uL


(1.2-3.4) 


 


 


Monocytes # (Auto)


  1.11 K/uL


(0.11-0.59) 


 


 


Eosinophils # (Auto)


  0.02 K/uL


(0-0.5) 


 


 


Basophils # (Auto)


  0.02 K/uL


(0-0.2) 


 


 


Immature Granulocyte # (Auto)


  0.16 K/uL


(0.00-0.02) 


 


 


Platelet Estimate NORMAL  


 


Hypochromasia PRESENT  


 


Prothrombin Time


  11.4 SECONDS


(9.0-12.0) 


 


 


Prothromb Time International


Ratio 1.1 (0.9-1.1) 


  


 


 


Activated Partial


Thromboplast Time 28.5 SECONDS


(21.0-31.0) 


 


 


Partial Thromboplastin Ratio 1.1  


 


Est Creatinine Clear Calc


Drug Dose 32.0 ml/min 


  


 


 


Magnesium Level


  2.0 mg/dl


(1.8-2.4) 


 


 


Total Bilirubin


  0.5 mg/dl


(0.2-1) 


 


 


Direct Bilirubin


  0.2 mg/dl


(0-0.2) 


 


 


Aspartate Amino Transf


(AST/SGOT) 16 U/L (15-37) 


  


 


 


Alanine Aminotransferase


(ALT/SGPT) 19 U/L (12-78) 


  


 


 


Alkaline Phosphatase


  98 U/L


() 


 


 


Troponin I


  < 0.015 ng/ml


(0-0.045) 


 


 


Pro-B-Type Natriuretic Peptide


  1170 pg/ml


(0-900) 


 


 


Total Protein


  7.2 gm/dl


(6.4-8.2) 


 


 


Albumin


  2.7 gm/dl


(3.4-5.0) 


 


 


Lipase


  98 U/L


() 


 


 


Beta-Hydroxybutyric Acid


  2.43 mg/dL


(0.2-2.81) 


 


 


Procalcitonin


  21.76 ng/ml


(0-0.5) 


 


 


Thyroid Stimulating Hormone


(TSH) 3.270 uIu/ml


(0.300-4.500) 


 


 


Bedside Lactic Acid Venous


  


  1.09 mmol/L


(0.90-1.70)








Laboratory results reviewed by me





Medications Administered











 Medications


  (Trade)  Dose


 Ordered  Sig/Erika


 Route  Start Time


 Stop Time Status Last Admin


Dose Admin


 


 Sodium Chloride  1,000 ml @ 


 125 mls/hr  Q8H STAT


 IV  11/16/17 11:17


 11/16/17 16:18 DC 11/16/17 11:57


125 MLS/HR


 


 Sodium Chloride  500 ml @ 


 999 mls/hr  Q31M STAT


 IV  11/16/17 12:37


 11/16/17 13:07 DC 11/16/17 12:37


999 MLS/HR


 


 Levofloxacin


  (Levaquin / D5W)  750 mg  NOW  STAT


 IV  11/16/17 12:39


 11/16/17 12:41 DC 11/16/17 14:17


750 MG


 


 Vancomycin HCl


 1500 mg/Sodium


 Chloride  530 ml @ 


 200 mls/hr  ONE  STAT


 IV  11/16/17 12:40


 11/16/17 15:18 DC 11/16/17 14:16


200 MLS/HR


 


 Sodium Chloride  1,000 ml @ 


 999 mls/hr  Q1H1M STAT


 IV  11/16/17 12:52


 11/16/17 13:52 DC 11/16/17 14:15


999 MLS/HR











ECG


Indication:  weakness


Rate (beats per minute):  108


Rhythm:  sinus tachycardia


Findings:  no acute ischemic change, no ectopy





ED Course


1117: Ordered Sodium Chloride 1000 ml @ 125 mls/hr IV.





1134: The patient was evaluated in room C6. A complete history and physical 

exam was performed.





1237: Ordered Sodium Chloride 500 ml @ 999 mls/hr IV.





1239: Ordered Levofloxacin 750 mg IV.





1240: Ordered Vancomycin HCl 1500 mg/Sodium Chloride 530 ml @ 200 ml/hr IV.





1247: I reevaluated the patient and he is resting comfortably.  I discussed the 

exam findings with him and I discussed the treatment plan.  He verbalized 

complete understanding and agreement.  He is going to be evaluated for further 

treatment.





1252: Ordered Sodium Chloride 1000 ml @ 999 mls/hr IV.





1304: I discussed the patient's Case with Ayaka Barnhart PA-C.  She is 

going to evaluate the patient for further treatment.





Medical Decision


Triage Nursing notes reviewed.


The patient's presentation and history were concerning for a fall, urinary 

symptoms and hypoxia.








Etiologies such as UTI, intracranial bleeding, pneumonia, metabolic, infection, 

hypo/hyperglycemia, electrolyte abnormalities, cardiac sources, intracerebral 

event, toxicologic, neurologic, as well as others were entertained.   


  








The patient was evaluated and was noted to have unstable vital signs.  He had a 

period of hypoxia and hypotension.  He was given fluids and supplemental 

oxygen.  He did better with this.  His Grady catheter was not draining.  The 

patient had blood work obtained and cultures were done.  He was found have a 

significant leukocytosis and anemia.  He had a type and cross performed.  The 

patient's troponin was negative.  He did have a mild elevation of his 

creatinine over baseline.  The patient had unremarkable electrolytes otherwise.

  The patient had concerning findings on chest x-ray as above.  He was given 

Levaquin and vancomycin.  The patient's urinalysis is also very concerning.  

Nursing was unable to flush his catheter.  This was removed.  A large clot was 

removed by me from the meatus.  It appears that the other catheter placed 

outside the Emergency Room was not functioning.  The patient was hydrated with 

normal saline.  On reassessment he was doing better.  The [facility] 

service was consulted.  He was evaluated in the Emergency Room for further 

management.





Medication Reconcilliation


Current Medication List:  was personally reviewed by me





Blood Pressure Screening


Patient's blood pressure:  Low blood pressure





Consults


Time Called:  1250


Consulting Physician:  Ayaka Barnhart PA-C


Returned Call:  1304


I discussed the patient's Case with Ayaka Barnhart PA-C.  She is going 

to evaluate the patient for further treatment.





Impression





 Primary Impression:  


 Sepsis


 Additional Impressions:  


 Pneumonia


 UTI (urinary tract infection)


 Anemia





Scribe Attestation


The scribe's documentation has been prepared under my direction and personally 

reviewed by me in its entirety. I confirm that the note above accurately 

reflects all work, treatment, procedures, and medical decision making performed 

by me.





Departure Information


Dispostion


Being Evaluated By Hospitalist





Referrals


No Doctor, Assigned (PCP)





Problem Qualifiers

## 2017-11-16 NOTE — HISTORY AND PHYSICAL
History & Physical


Date & Time of Service:


Nov 16, 2017 at 14:20


Chief Complaint:


Illness/Poss Uti


Primary Care Physician:


Stephanie Covarrubias D.O.


History of Present Illness


Source:  patient, clinic records, hospital records, nursing home


This is a 73yo M who resides at [facility] with a PMH of DM II (uncontrolled)

, HTN, HLD, CKD III and urinary retention (with a wade) who presents with 

dysuria, penile pain and generalized weakness. Per nursing home and patient, 

urinary symptoms have been present for a few days. Has had an indwelling wade 

catheter for the past month, which was changed 48 hours ago when the nurses 

noticed penile discharge. Patient was transferring from his wheelchair to his 

bed today when he lost his balance and fell backward and hit his head. Was 

brought to ER for further evaluation. 





Patient is A&O x4 despite endorsing intermittent confusion. Was found to have 

an elevated white count of 25,000 and was hypoxic on room air. Now O2 

saturation is in the high 90s on 2L NC. Denies using oxygen at [facility]. 

States that he still has a cough and nasal congestion from a URI last week. 

Denies any fever, chills, lightheadedness, visual changes, sore throat, CP, 

palpitations, SOB, abd pain, nausea, vomiting or LE swelling.





Past Medical/Surgical History


Medical Problems:


(1) Benign hypertension


Status: Chronic  





(2) BPH (benign prostatic hyperplasia)


Status: Chronic  





(3) CKD (chronic kidney disease), stage III


Status: Chronic  





(4) Diabetes mellitus type 2


Status: Chronic  





(5) GERD (gastroesophageal reflux disease)


Status: Chronic  





(6) Hyperlipidemia


Status: Chronic  





Surgical Problems:


(1) H/O skin graft


Status: Chronic  





(2) S/P tonsillectomy


Status: Chronic  








Family History





Diabetes mellitus


  SISTER


Hypertension


  MOTHER





Social History


Smoking Status:  Former Smoker


Drug Use:  none


Marital Status:  


Housing status:  nursing home


Occupational Status:  retired





Immunizations


History of Influenza Vaccine:  Yes


Influenza Vaccine Date:  Sep 19, 2012


History of Tetanus Vaccine?:  Unknown


History of Pneumococcal:  Yes


Pneumococcal Date:  Sep 30, 2012


History of Hepatitis B Vaccine:  No





Multi-Drug Resistant Organisms


History of MDRO:  No





Allergies


Coded Allergies:  


     No Known Allergies (Unverified , 11/16/17)





Home Medications


Scheduled


Albuterol Sulfate (Proair Respiclick), 2 PUFFS INH QID


Amlodipine (Norvasc), 5 MG PO DAILY


Aspirin Enteric Coated (Ecotrin Or Generic), 81 MG PO QAM


Atorvastatin (Lipitor), 10 MG PO DAILY


Finasteride (Finasteride), 5 MG PO QAM


Gabapentin (Neurontin), 100 MG PO DAILY


Insulin Glargine (Lantus), 40 UNITS SC DAILY


Tamsulosin HCl (Tamsulosin HCl), 0.4 MG PO HS


Zinc Oxide (Topical) (Desitin), 1 APPLN TOP HS





Scheduled PRN


Acetaminophen (Tylenol), 500 MG PO Q4 PRN for Pain


Insulin Human NPH (Humulin N), SC UD PRN for SLIDING SCALE


Polyethylene Glycol 3350 (Qc Natura-Lax), 17 GM PO DAILY PRN for Constipation





Review of Systems


Ten systems reviewed and negative except as noted in the HPI.





Physical Exam


Vital Signs











  Date Time  Temp Pulse Resp B/P (MAP) Pulse Ox O2 Delivery O2 Flow Rate FiO2


 


11/16/17 13:30     96 Nasal Cannula 2.0 


 


11/16/17 12:19  104 18 109/65 96 Nasal Cannula 2.0 


 


11/16/17 11:30  108 18 108/66 97 Nasal Cannula 2.0 


 


11/16/17 11:10  109      


 


11/16/17 11:01     95 Nasal Cannula 2.0 


 


11/16/17 11:00     87 Room Air  


 


11/16/17 11:00 37.4 110 18 96/63 87 Room Air  


 


11/16/17 11:00 37.4 110 18 96/63 87 Room Air  








General Appearance:  WD/WN, no apparent distress


Head:  normocephalic, atraumatic


Eyes:  normal inspection, PERRL, sclerae normal, + pertinent finding (Blind in 

R eye )


ENT:  normal ENT inspection, hearing grossly normal, pharynx normal (dry mucous 

membranes ), + nasal congestion


Neck:  supple, no JVD, trachea midline


Respiratory/Chest:  chest non-tender, lungs clear, normal breath sounds, no 

respiratory distress, no accessory muscle use, + pertinent finding (Breathing 

comfortably on 2L NC)


Cardiovascular:  no murmur, normal peripheral pulses, + tachycardia


Abdomen/GI:  normal bowel sounds, non tender, soft, no organomegaly


Genitourinary - Male:  normal male genitalia, + pertinent finding (Wade 

catheter in place. Marked pain when nurse irrigated wade. + pyuria )


Back:  normal inspection


Extremities/Musculoskelatal:  normal inspection, no calf tenderness, no pedal 

edema, non-tender


Neurologic/Psych:  no motor/sensory deficits, alert, normal mood/affect, 

oriented x 3


Skin:  normal color, warm/dry





Diagnostics


Laboratory Results





Results Past 24 Hours








Test


  11/16/17


10:44 11/16/17


11:45 Range/Units


 


 


White Blood Count 25.75  4.8-10.8  K/uL


 


Red Blood Count 2.93  4.7-6.1  M/uL


 


Hemoglobin 7.8  14.0-18.0  g/dL


 


Hematocrit 25.5  42-52  %


 


Mean Corpuscular Volume 87.0    fL


 


Mean Corpuscular Hemoglobin 26.6  25-34  pg


 


Mean Corpuscular Hemoglobin


Concent 30.6


  


  32-36  g/dl


 


 


Platelet Count 265  130-400  K/uL


 


Mean Platelet Volume 9.6  7.4-10.4  fL


 


Neutrophils (%) (Auto) 93.1   %


 


Lymphocytes (%) (Auto) 1.8   %


 


Monocytes (%) (Auto) 4.3   %


 


Eosinophils (%) (Auto) 0.1   %


 


Basophils (%) (Auto) 0.1   %


 


Neutrophils # (Auto) 23.97  1.4-6.5  K/uL


 


Lymphocytes # (Auto) 0.47  1.2-3.4  K/uL


 


Monocytes # (Auto) 1.11  0.11-0.59  K/uL


 


Eosinophils # (Auto) 0.02  0-0.5  K/uL


 


Basophils # (Auto) 0.02  0-0.2  K/uL


 


RDW Standard Deviation 44.7  36.4-46.3  fL


 


RDW Coefficient of Variation 14.1  11.5-14.5  %


 


Immature Granulocyte % (Auto) 0.6   %


 


Immature Granulocyte # (Auto) 0.16  0.00-0.02  K/uL


 


Platelet Estimate NORMAL   


 


Hypochromasia PRESENT   


 


Prothrombin Time


  11.4


  


  9.0-12.0


SECONDS


 


Prothromb Time International


Ratio 1.1


  


  0.9-1.1  


 


 


Activated Partial


Thromboplast Time 28.5


  


  21.0-31.0


SECONDS


 


Partial Thromboplastin Ratio 1.1   


 


Sodium Level 133  136-145  mmol/L


 


Potassium Level 4.8  3.5-5.1  mmol/L


 


Chloride Level 102    mmol/L


 


Carbon Dioxide Level 23  21-32  mmol/L


 


Anion Gap 8.0  3-11  mmol/L


 


Blood Urea Nitrogen 40  7-18  mg/dl


 


Creatinine


  2.17


  


  0.60-1.40


mg/dl


 


Est Creatinine Clear Calc


Drug Dose 32.0


  


   ml/min


 


 


Estimated GFR (


American) 34.2


  


   


 


 


Estimated GFR (Non-


American 29.5


  


   


 


 


BUN/Creatinine Ratio 18.6  10-20  


 


Random Glucose 322  70-99  mg/dl


 


Calcium Level 8.1  8.5-10.1  mg/dl


 


Magnesium Level 2.0  1.8-2.4  mg/dl


 


Total Bilirubin 0.5  0.2-1  mg/dl


 


Direct Bilirubin 0.2  0-0.2  mg/dl


 


Aspartate Amino Transf


(AST/SGOT) 16


  


  15-37  U/L


 


 


Alanine Aminotransferase


(ALT/SGPT) 19


  


  12-78  U/L


 


 


Alkaline Phosphatase 98    U/L


 


Troponin I < 0.015  0-0.045  ng/ml


 


Total Protein 7.2  6.4-8.2  gm/dl


 


Albumin 2.7  3.4-5.0  gm/dl


 


Lipase 98    U/L


 


Beta-Hydroxybutyric Acid 2.43  0.2-2.81  mg/dL


 


Thyroid Stimulating Hormone


(TSH) 3.270


  


  0.300-4.500


uIu/ml


 


Bedside Lactic Acid Venous


  


  1.09


  0.90-1.70


mmol/L








Microbiology Results


11/16/17 Blood Culture, Received


           Pending


11/16/17 Blood Culture, Received


           Pending





Diagnostic Radiology


CT head: IMPRESSION: No acute intracranial findings





CXR: IMPRESSION: Diffuse elevation of the interstitium. This likely represents


pulmonary vascular congestion although a bilateral interstitial inflammatory


process could appear similar. Clinical and radiographic follow-up is


recommended.





EKG


Accelerated Junctional rhythm


Abnormal ECG





Impression


Assessment and Plan


This is a 73yo M who resides at [facility] with a PMH of DM II (uncontrolled)

, HTN, HLD, CKD III and urinary retention (with a wade) who presents with 

dysuria, penile pain and generalized weakness.





Sepsis 2/2 UTI, PNA: 


-Initially hypotensive at 93/63, tachycardic at 110 


-BP normotensive, HR improved after IVF resuscitation 


-Leukocytosis of 25, POC Lactate of 1.09


-Urinary source of infection, as well as likely PNA 


-Empiric abx treatment initiated with vanc, levoquin 


-Blood and urine cx pending 


-IVF resuscitation 


-Monitor on tele 





UTI: 


-UA with large amount of leuk esterase, nitrite 


-Likely 2/2 urinary retention, wade in place long term 


-Empiric abx treatment initiated with vanc, levoquin 





Hypoxia 2/2 PNA: 


-Hypoxic in ER in high 80s on RA


-CXR with bilateral interstitial process vs. pulm vasc congestion 


-O2 saturation improved to high 90s on 2L NC


-Does not require home O2


-Continue supplemental O2 per protocol 





DEANNE on CKD III: 


-Cr elevated to 2.17 (baseline is 1.2)


-Likely multifactorial: poor PO intake in addition to possible obstruction 2/2 

BPH, urinary retention 


-Denies NSAID use


-Received 2 L in ER. Continuing at 125ml/hr for 1 addition L


-Renally dose abx


-Monitor BMP 





Urinary retention: 


H/o BPH


-Was not voiding in ER despite IVF resuscitation 


-Complaining of pain during wade irrigation 


-Cont tamsulosin, finasteride 


-Urology consult 





Chronic anemia: 


-Hgb of 7.8 today. Was 11.3 in Sept 


-+ weakness, fatigue. Denies palpitations, CP, SOB 


-Denies hematuria, blood per rectum


-Hemoccult pending 


-Type and screen 


-Transfuse if hgb <7 


-Recheck H&H this evening 





DM II: 


-Hgb a1c of 9/6 on 2/17


-Recheck hgb a1c


-BSG of 322 in ER 


-Hold home regimen 


-Basal bolus regimen while in-patient 


-Pharm consult due to fluctuating BSG at nursing home 





HTN: 


-Normotensive 


-Cont home amlodipine with hold parameters 





HLD: 


-Continue statin





DVT Ppx: Heparin SQ


Code status: FULL 


PCP: Marci 


Dispo: SW consulted to help with discharge placement back to [facility] 





Patient seen in collaboration with Dr. Ferris. Please see addendum. 





Addendum


Attending physician Dr. Ferris:





I have seen and examined the with MATT Davis and agree with assessment and 

plan above and would like to comment that this a patient with vital signs and 

leukocytosis concerning for sepsis with urinary source as the most likely 

source of infection given his history of chronic wade use. Cultures pending, 

appreciate urology service recommendations. Patient also noted to be having 

worsening renal function over time and more anemic. Possibly the anemia could 

be due to renal disease versus anemia from chronic illness. Will trend H/H and 

maintain active type and screen.





Level of Care


Telemetry





Advanced Directives


Existing Living Will:  No


Existing Power of :  No





Resuscitation Status


FULL RESUSCITATION





VTE Prophylaxis


VTE Risk Assessment Done? Y/N:  Yes


Risk Level:  Moderate


Given or contraindicated:  Unfractionated heparin SQ





Social Service Consult


Lives in Nursing Home

## 2017-11-16 NOTE — PHARMACY PROGRESS NOTE
Pharmacy Antibiotic Consult


Date of Service:


Nov 16, 2017.


Pharmacy Dosing Scope


Pharmacy is consulted to initiate vancomycin/levaquin IV dosing therapy, order 

appropriate labs and adjust drug dose/frequency.





Subjective


The patient is a 72 year old male admitted on Nov 16, 2017 at 13:48.





Objective


Height (Feet):  6


Height (Inches):  0.00


Weight (Kilograms):  72.500


Lab Results (24hrs):











Test


  11/16/17


10:44 11/16/17


11:45 11/16/17


14:20


 


White Blood Count


  25.75 K/uL


(4.8-10.8) 


  


 


 


Red Blood Count


  2.93 M/uL


(4.7-6.1) 


  


 


 


Hemoglobin


  7.8 g/dL


(14.0-18.0) 


  


 


 


Hematocrit 25.5 % (42-52)   


 


Mean Corpuscular Volume


  87.0 fL


() 


  


 


 


Mean Corpuscular Hemoglobin


  26.6 pg


(25-34) 


  


 


 


Mean Corpuscular Hemoglobin


Concent 30.6 g/dl


(32-36) 


  


 


 


Platelet Count


  265 K/uL


(130-400) 


  


 


 


Mean Platelet Volume


  9.6 fL


(7.4-10.4) 


  


 


 


Neutrophils (%) (Auto) 93.1 %   


 


Lymphocytes (%) (Auto) 1.8 %   


 


Monocytes (%) (Auto) 4.3 %   


 


Eosinophils (%) (Auto) 0.1 %   


 


Basophils (%) (Auto) 0.1 %   


 


Neutrophils # (Auto)


  23.97 K/uL


(1.4-6.5) 


  


 


 


Lymphocytes # (Auto)


  0.47 K/uL


(1.2-3.4) 


  


 


 


Monocytes # (Auto)


  1.11 K/uL


(0.11-0.59) 


  


 


 


Eosinophils # (Auto)


  0.02 K/uL


(0-0.5) 


  


 


 


Basophils # (Auto)


  0.02 K/uL


(0-0.2) 


  


 


 


RDW Standard Deviation


  44.7 fL


(36.4-46.3) 


  


 


 


RDW Coefficient of Variation


  14.1 %


(11.5-14.5) 


  


 


 


Immature Granulocyte % (Auto) 0.6 %   


 


Immature Granulocyte # (Auto)


  0.16 K/uL


(0.00-0.02) 


  


 


 


Platelet Estimate NORMAL   


 


Hypochromasia PRESENT   


 


Prothrombin Time


  11.4 SECONDS


(9.0-12.0) 


  


 


 


Prothromb Time International


Ratio 1.1 (0.9-1.1) 


  


  


 


 


Activated Partial


Thromboplast Time 28.5 SECONDS


(21.0-31.0) 


  


 


 


Partial Thromboplastin Ratio 1.1   


 


Sodium Level


  133 mmol/L


(136-145) 


  


 


 


Potassium Level


  4.8 mmol/L


(3.5-5.1) 


  


 


 


Chloride Level


  102 mmol/L


() 


  


 


 


Carbon Dioxide Level


  23 mmol/L


(21-32) 


  


 


 


Anion Gap


  8.0 mmol/L


(3-11) 


  


 


 


Blood Urea Nitrogen


  40 mg/dl


(7-18) 


  


 


 


Creatinine


  2.17 mg/dl


(0.60-1.40) 


  


 


 


Est Creatinine Clear Calc


Drug Dose 32.0 ml/min 


  


  


 


 


Estimated GFR (


American) 34.2 


  


  


 


 


Estimated GFR (Non-


American 29.5 


  


  


 


 


BUN/Creatinine Ratio 18.6 (10-20)   


 


Random Glucose


  322 mg/dl


(70-99) 


  


 


 


Calcium Level


  8.1 mg/dl


(8.5-10.1) 


  


 


 


Magnesium Level


  2.0 mg/dl


(1.8-2.4) 


  


 


 


Total Bilirubin


  0.5 mg/dl


(0.2-1) 


  


 


 


Direct Bilirubin


  0.2 mg/dl


(0-0.2) 


  


 


 


Aspartate Amino Transf


(AST/SGOT) 16 U/L (15-37) 


  


  


 


 


Alanine Aminotransferase


(ALT/SGPT) 19 U/L (12-78) 


  


  


 


 


Alkaline Phosphatase


  98 U/L


() 


  


 


 


Troponin I


  < 0.015 ng/ml


(0-0.045) 


  


 


 


Pro-B-Type Natriuretic Peptide


  1170 pg/ml


(0-900) 


  


 


 


Total Protein


  7.2 gm/dl


(6.4-8.2) 


  


 


 


Albumin


  2.7 gm/dl


(3.4-5.0) 


  


 


 


Lipase


  98 U/L


() 


  


 


 


Beta-Hydroxybutyric Acid


  2.43 mg/dL


(0.2-2.81) 


  


 


 


Procalcitonin


  21.76 ng/ml


(0-0.5) 


  


 


 


Thyroid Stimulating Hormone


(TSH) 3.270 uIu/ml


(0.300-4.500) 


  


 


 


Bedside Lactic Acid Venous


  


  1.09 mmol/L


(0.90-1.70) 


 








Micro Results:











 Date/Time


Source Procedure


Growth Status


 


 


 11/16/17 11:46


Blood Blood Culture


Pending Received


 


 11/16/17 11:41


Blood Blood Culture


Pending Received











Assessment & Plan


Assessment:


71 yo male from nursing home with PMH of DMII, HTN, HLD, CKDII and urinary 

retention (baseline Cr 1.2) presented with dysuria, penile pain and generalized 

weakness, SOB. Grady last changed 48 hours ago. WBC 25, Afebrile. Vancomycin/

levaquin for possible pna/uti.





Plan:





Loading dose:   1500 mg (20.6 mg/kg) IV X 1 dose then:





Vancomycin 1250 mg (17mg/kg) q24H


   PK: SCr 2.17, CrCl 32, T1/2~22 hours


   Reassess in AM with anticipated improved renal function


Goal trough level estimate:  between 15-20 mcg/mL.





Will reassess in AM and determine dosing. Will wait to order trough due to 

possible changing renal function.














Pharmacy will continue to follow and will adjust dose/frequency as necessary.  

Thank you

## 2017-11-16 NOTE — DIAGNOSTIC IMAGING REPORT
KUB



HISTORY: History of urinary retention. Concern for possible acute

hydronephrosis.  R/o hydronephrosis 



COMPARISON: Renal ultrasound 9/12/2017.



FINDINGS: Mildly dilated loops of small bowel are seen within the midabdomen

measuring up to 3.2 cm with air also noted throughout the colon. Moderate to

extensive volume of formed stool seen throughout the colon. There is no

organomegaly.  No renal calculi. No ureteral calculi. No pneumoperitoneum or

pneumatosis. No fracture. Degenerative changes of the pelvis, hips and spine are

noted. Vascular calcifications are seen. Probable phleboliths.



IMPRESSION:  



1. No definite urolith identified, however renal shadows are obscured by bowel

gas.

2. Moderate to extensive volume of formed colonic stool throughout suggests

constipation.

3. Mildly dilated loops of small bowel within the midabdomen suggest ileus or

low-grade small bowel obstruction.







Electronically signed by:  Jacob Bettencourt M.D.

11/16/2017 4:36 PM



Dictated Date/Time:  11/16/2017 4:34 PM

## 2017-11-16 NOTE — DIAGNOSTIC IMAGING REPORT
CHEST ONE VIEW PORTABLE



CLINICAL HISTORY: Weakness    



COMPARISON STUDY:  9/11/2017



FINDINGS: The heart is normal in size. There is aortic tortuosity/ectasia. There

is diffuse elevation of the interstitium. Pulmonary vascular congestion is

suspected. A bilateral interstitial inflammatory process could appear similar

but is felt to be less likely. Clinical and radiographic follow-up is

recommended. There are no pleural effusions.[ 



IMPRESSION: Diffuse elevation of the interstitium. This likely represents

pulmonary vascular congestion although a bilateral interstitial inflammatory

process could appear similar. Clinical and radiographic follow-up is

recommended.







Electronically signed by:  Ed Singh M.D.

11/16/2017 12:08 PM



Dictated Date/Time:  11/16/2017 12:07 PM

## 2017-11-16 NOTE — DIAGNOSTIC IMAGING REPORT
CT HEAD WITHOUT CONTRAST (CT)



CLINICAL HISTORY: fall, confusion HEAD PAIN



COMPARISON STUDY:  No previous studies for comparison.



TECHNIQUE:  Axial CT of the brain is performed from the vertex to the skull

base. IV contrast was not administered for this examination. A dose lowering

technique was utilized adhering to the principles of ALARA.

 



CT DOSE: 614.27 mGy.cm



FINDINGS:



No intra or extra-axial mass lesions are visualized. There is no CT evidence of

acute cortical infarction. There is no evidence of midline shift. There is no

acute  hemorrhage. No calvarial fractures are visualized. 

There are minimal white matter hypodensities likely on a small vessel basis.

There is basal ganglial mineralization.

There is no evidence of pathologic ventricular dilatation.

There is no evidence of acute sinusitis



IMPRESSION: No acute intracranial findings







Electronically signed by:  Ed Singh M.D.

11/16/2017 12:06 PM



Dictated Date/Time:  11/16/2017 12:05 PM

## 2017-11-16 NOTE — UROLOGY CONSULTATION
History


General


Date of Service:


Nov 16, 2017.


Chief Complaint:  urinary retention


Primary Care Physician:


Stephanie Covarrubias D.O.


Pt seen a urologist before?:  Yes


If yes, why?:  urinary retention





History of Present Illness


I am asked to see pt for urinary retention.  he is well known to me.  He has 

had retention for just over a month.  He is living in a non skilled nursing 

home and a home health nurse changed his catheter earlier this week.  it did 

not seem to go well.  He describes pain with insertion.  He presented to ER 

today after a fall due to weakness and had an overwhelming infection by labs.  

His bladder was full despite wade.  it could not be irrigated.  It was removed 

and a new catheter was placed which drained 1400mL.  He has had urethral 

bleeding along side the catheter but no blood in the collection bag suggesting 

he is having only urethral bleeding.  Thus I suspect the wade from the nursing 

home was not all the way in.  


He is draining frankly purulent yellow urine in large amounts.  He is perking 

up with iv abt and fluids.





Laboratory





Results Past 24 Hours








Test


  11/16/17


10:44 11/16/17


11:45 11/16/17


14:20 11/16/17


17:00 Range/Units


 


 


White Blood Count 25.75    4.8-10.8  K/uL


 


Red Blood Count 2.93    4.7-6.1  M/uL


 


Hemoglobin 7.8    14.0-18.0  g/dL


 


Hematocrit 25.5    42-52  %


 


Mean Corpuscular Volume 87.0      fL


 


Mean Corpuscular Hemoglobin 26.6    25-34  pg


 


Mean Corpuscular Hemoglobin


Concent 30.6


  


  


  


  32-36  g/dl


 


 


Platelet Count 265    130-400  K/uL


 


Mean Platelet Volume 9.6    7.4-10.4  fL


 


Neutrophils (%) (Auto) 93.1     %


 


Lymphocytes (%) (Auto) 1.8     %


 


Monocytes (%) (Auto) 4.3     %


 


Eosinophils (%) (Auto) 0.1     %


 


Basophils (%) (Auto) 0.1     %


 


Neutrophils # (Auto) 23.97    1.4-6.5  K/uL


 


Lymphocytes # (Auto) 0.47    1.2-3.4  K/uL


 


Monocytes # (Auto) 1.11    0.11-0.59  K/uL


 


Eosinophils # (Auto) 0.02    0-0.5  K/uL


 


Basophils # (Auto) 0.02    0-0.2  K/uL


 


RDW Standard Deviation 44.7    36.4-46.3  fL


 


RDW Coefficient of Variation 14.1    11.5-14.5  %


 


Immature Granulocyte % (Auto) 0.6     %


 


Immature Granulocyte # (Auto) 0.16    0.00-0.02  K/uL


 


Platelet Estimate NORMAL     


 


Hypochromasia PRESENT     


 


Prothrombin Time


  11.4


  


  


  


  9.0-12.0


SECONDS


 


Prothromb Time International


Ratio 1.1


  


  


  


  0.9-1.1  


 


 


Activated Partial


Thromboplast Time 28.5


  


  


  


  21.0-31.0


SECONDS


 


Partial Thromboplastin Ratio 1.1     


 


Sodium Level 133    136-145  mmol/L


 


Potassium Level 4.8    3.5-5.1  mmol/L


 


Chloride Level 102      mmol/L


 


Carbon Dioxide Level 23    21-32  mmol/L


 


Anion Gap 8.0    3-11  mmol/L


 


Blood Urea Nitrogen 40    7-18  mg/dl


 


Creatinine


  2.17


  


  


  


  0.60-1.40


mg/dl


 


Est Creatinine Clear Calc


Drug Dose 32.0


  


  


  


   ml/min


 


 


Estimated GFR (


American) 34.2


  


  


  


   


 


 


Estimated GFR (Non-


American 29.5


  


  


  


   


 


 


BUN/Creatinine Ratio 18.6    10-20  


 


Random Glucose 322    70-99  mg/dl


 


Calcium Level 8.1    8.5-10.1  mg/dl


 


Magnesium Level 2.0    1.8-2.4  mg/dl


 


Total Bilirubin 0.5    0.2-1  mg/dl


 


Direct Bilirubin 0.2    0-0.2  mg/dl


 


Aspartate Amino Transf


(AST/SGOT) 16


  


  


  


  15-37  U/L


 


 


Alanine Aminotransferase


(ALT/SGPT) 19


  


  


  


  12-78  U/L


 


 


Alkaline Phosphatase 98      U/L


 


Troponin I < 0.015    0-0.045  ng/ml


 


Pro-B-Type Natriuretic Peptide 1170    0-900  pg/ml


 


Total Protein 7.2    6.4-8.2  gm/dl


 


Albumin 2.7    3.4-5.0  gm/dl


 


Lipase 98      U/L


 


Beta-Hydroxybutyric Acid 2.43    0.2-2.81  mg/dL


 


Procalcitonin 21.76    0-0.5  ng/ml


 


Thyroid Stimulating Hormone


(TSH) 3.270


  


  


  


  0.300-4.500


uIu/ml


 


Bedside Lactic Acid Venous


  


  1.09


  


  


  0.90-1.70


mmol/L


 


Urine Color   YELLOW   


 


Urine Appearance   CLOUDY  CLEAR  


 


Urine pH   5.0  4.5-7.5  


 


Urine Specific Gravity   1.015  1.000-1.030  


 


Urine Protein   1+  NEG  


 


Urine Glucose (UA)   2+  NEG  


 


Urine Ketones   NEG  NEG  


 


Urine Occult Blood   3+  NEG  


 


Urine Nitrite   POS  NEG  


 


Urine Bilirubin   NEG  NEG  


 


Urine Urobilinogen   NEG  NEG  


 


Urine Leukocyte Esterase   LARGE  NEG  


 


Urine WBC (Auto)   >30  0-5  /hpf


 


Urine RBC (Auto)   >30  0-4  /hpf


 


Urine Hyaline Casts (Auto)   0  0-5  /lpf


 


Urine Epithelial Cells (Auto)   0-5  0-5  /lpf


 


Urine Bacteria (Auto)   2+  NEG  


 


Urine Pathogenic Casts     0  /lpf


 


Influenza Type A (RT-PCR)    Neg for Influ A NEG  


 


Influenza Type B (RT-PCR)    Neg for Influ B NEG  


 


Test


  11/16/17


17:15 11/16/17


17:48 


  


  Range/Units


 


 


Bedside Glucose 219    70-99  mg/dl


 


White Blood Count  20.66   4.8-10.8  K/uL


 


Red Blood Count  2.79   4.7-6.1  M/uL


 


Hemoglobin  7.6   14.0-18.0  g/dL


 


Hematocrit  24.2   42-52  %


 


Mean Corpuscular Volume  86.7     fL


 


Mean Corpuscular Hemoglobin  27.2   25-34  pg


 


Mean Corpuscular Hemoglobin


Concent 


  31.4


  


  


  32-36  g/dl


 


 


RDW Standard Deviation  44.6   36.4-46.3  fL


 


RDW Coefficient of Variation  14.1   11.5-14.5  %


 


Platelet Count  227   130-400  K/uL


 


Mean Platelet Volume  8.6   7.4-10.4  fL


 


Sodium Level  136   136-145  mmol/L


 


Potassium Level  5.1   3.5-5.1  mmol/L


 


Chloride Level  105     mmol/L


 


Carbon Dioxide Level  24   21-32  mmol/L


 


Anion Gap  7.0   3-11  mmol/L


 


Blood Urea Nitrogen  42   7-18  mg/dl


 


Creatinine


  


  2.22


  


  


  0.60-1.40


mg/dl


 


Est Creatinine Clear Calc


Drug Dose 


  31.3


  


  


   ml/min


 


 


Estimated GFR (


American) 


  33.3


  


  


   


 


 


Estimated GFR (Non-


American 


  28.7


  


  


   


 


 


BUN/Creatinine Ratio  18.7   10-20  


 


Random Glucose  221   70-99  mg/dl


 


Lactic Acid Level  1.4   0.4-2.0  mmol/L


 


Calcium Level  7.8   8.5-10.1  mg/dl








Microbiology Results


11/16/17 Blood Culture, Received


           Pending


11/16/17 Blood Culture, Received


           Pending


11/16/17 MRSA DNA Surveillance Screen, Received


           Pending


Labs were reviewed and are within normal limits unless listed below. Labs are 

available in the chart and at [facility]





Problem List


Medical Problems:


(1) Anemia


Status: Acute  





(2) Cutaneous abscess of chest wall


Status: Acute  





(3) Pneumonia


Status: Acute  





(4) Routine lab draw


Status: Acute  





(5) Sepsis


Status: Acute  





(6) UTI (urinary tract infection)


Status: Acute  











Past History


diabetes, hypertension, other


Past Surgical History:  no surgical history





Family History





Diabetes mellitus


  SISTER


Hypertension


  MOTHER





Social History


Hx Tobacco Use In Past Year?:  No (QUIT 30 YEARS AGO)


Alcohol:  socially


Marital status:  


Housing status:  nursing home


Occupation status:  retired





Immunizations


History of Influenza Vaccine:  Yes


Influenza Vaccine Date:  Sep 19, 2012


History of Tetanus Vaccine?:  Unknown


History of Pneumococcal:  Yes


Pneumococcal Date:  Sep 30, 2012


History of Hepatitis B Vaccine:  No





History of MDRO


No





Allergies


Coded Allergies:  


     No Known Allergies (Unverified , 11/16/17)





Medications


Home Medications:





Home Meds and Scripts








 Medications  Dose


 Route/Sig


 Max Daily Dose Days Date Category Dose


Instructions


 


 Qc Natura-Lax


  (Polyethylene


 Glycol 3350) 1


 Pow Pow  17 Gm


 PO DAILY PRN


    11/16/17 Reported 


 


 Tylenol


  (Acetaminophen)


 500 Mg Tab  500 Mg


 PO Q4 PRN


    11/16/17 Reported  MAX 3 GM APAP/24 HRS


 


 Lantus (Insulin


 Glargine) 100


 Unit/Ml Inj  40 Units


 SC DAILY


    11/16/17 Reported 


 


 Proair Respiclick


  (Albuterol


 Sulfate) 108


 Mcg/Act Aer  2 Puffs


 INH QID


    11/16/17 Reported 


 


 Humulin N


  (Insulin Human


 NPH) 100 Units/Ml


 Susp  


 SC UD PRN


    11/16/17 Reported  DM- =0U


 131-180=4U


 181-240=8U


 241-300=10U


 301-350=12U


 351-400=16U


 >400=20U AND CALL MD


 


 Norvasc


  (Amlodipine


 Besylate) 5 Mg Tab  5 Mg


 PO DAILY


    11/16/17 Reported 


 


 Desitin (Zinc


 Oxide (Topical))


 40 % Oin  1 Appln


 TOP HS


    11/16/17 Reported  APPLY TO THE PENIS AT BEDTIME AND AS NEEDED


 


 Finasteride 5 Mg


 Tab  5 Mg


 PO QAM


   30 9/14/17 Rx 


 


 Tamsulosin HCl


 0.4 Mg Cap  0.4 Mg


 PO HS


   30 9/14/17 Rx 


 


 Neurontin


  (Gabapentin) 100


 Mg Cap  100 Mg


 PO DAILY


    2/21/17 Reported 


 


 Lipitor


  (Atorvastatin


 Calcium) 10 Mg Tab  10 Mg


 PO DAILY


    2/5/14 Reported 


 


 Ecotrin Or


 Generic (Aspirin)


 81 Mg Tab  81 Mg


 PO QAM


    9/29/12 Reported 








Inpatient Medications:





Current Inpatient Medications








 Medications


  (Trade)  Dose


 Ordered  Sig/Erika


 Route  Start Time


 Stop Time Status Last Admin


Dose Admin


 


 Acetaminophen


  (Tylenol Tab)  650 mg  Q4H  PRN


 PO  11/16/17 14:00


 12/16/17 13:59   


 


 


 Ondansetron HCl


  (Zofran Inj)  4 mg  Q6H  PRN


 IV  11/16/17 14:00


 12/16/17 13:59   


 


 


 Insulin Aspart


  (novoLOG ASPART)  **SLIDING


 SCALE**


 **If C...  ACHS


 SC  11/16/17 16:00


 12/16/17 15:59  11/16/17 17:19


8 UNITS


 


 Glucose


  (Glucose 40% Gel)  15-30


 GRAMS 15


 GRAMS...  UD  PRN


 PO  11/16/17 14:00


 12/16/17 13:59   


 


 


 Glucose


  (Glucose Chew


 Tab)  4-8


 Tablets 4


 Tabl...  UD  PRN


 PO  11/16/17 14:00


 12/16/17 13:59   


 


 


 Dextrose


  (Dextrose 50%


 50ML Syringe)  25-50ML OF


 50% DW IV


 FOR...  UD  PRN


 IV  11/16/17 14:00


 12/16/17 13:59   


 


 


 Glucagon


  (Glucagon Inj)  1 mg  UD  PRN


 SQ  11/16/17 14:00


 12/16/17 13:59   


 


 


 Sodium Chloride  1,000 ml @ 


 125 mls/hr  Q8H


 IV  11/16/17 14:00


 11/16/17 21:59  11/16/17 17:17


125 MLS/HR


 


 Amlodipine


 Besylate


  (Norvasc Tab)  5 mg  DAILY


 PO  11/17/17 09:00


 12/17/17 08:59   


 


 


 Aspirin


  (Ecotrin Tab)  81 mg  QAM


 PO  11/17/17 09:00


 12/17/17 08:59   


 


 


 Atorvastatin


 Calcium


  (Lipitor Tab)  10 mg  DAILY


 PO  11/17/17 09:00


 12/17/17 08:59   


 


 


 Finasteride


  (Proscar Tab)  5 mg  QAM


 PO  11/17/17 09:00


 12/17/17 08:59   


 


 


 Gabapentin


  (Neurontin Cap)  100 mg  DAILY


 PO  11/17/17 09:00


 12/17/17 08:59   


 


 


 Tamsulosin HCl


  (Flomax Cap)  0.4 mg  HS


 PO  11/16/17 21:00


 12/16/17 20:59   


 


 


 Levofloxacin


  (Consult)  1 ea  UD  PRN


 N/A  11/16/17 14:45


 12/16/17 14:44   


 


 


 Vancomycin HCl


  (Consult)  1 ea  UD  PRN


 N/A  11/16/17 14:45


 12/16/17 14:44   


 


 


 Heparin Sodium


  (Porcine)


  (Heparin Sq 5000


 Unit/0.5ml)  5,000 unit  Q12


 SQ  11/16/17 21:00


 12/16/17 20:59   


 


 


 Vancomycin HCl


 1250 mg/Sodium


 Chloride  275 ml @ 


 200 mls/hr  Q24H


 IV  11/17/17 12:00


 11/24/17 11:59   


 


 


 Levofloxacin 750


 mg/Prmx  150 ml @ 


 100 mls/hr  Q48H


 IV  11/18/17 14:00


 11/25/17 13:59   


 


 


 Albuterol


  (Ventolin Hfa


 Inhaler)  2 puffs  QID


 INH  11/16/17 17:00


 12/16/17 16:59  11/16/17 17:17


2 PUFFS


 


 Miscellaneous


 Information


  (Consult


 Glycemic


 Management


 Pharmacy)  1 [facility]


 N/A  11/16/17 15:42


 12/16/17 15:41   


 


 


 Insulin Glargine


  (Lantus Solostar


 Pen)  20 units  Q12


 SC  11/16/17 21:00


 12/16/17 20:59   


 


 


 Insulin Aspart


  (novoLOG ASPART)  **SLIDING


 SCALE**


 **If C...  TODAY@0000,0400


 SC  11/17/17 00:00


 11/17/17 04:01   


 











Review of Systems


Review of Systems


Constitutional:  + fever, + chills, + weight loss


Neurological:  + dizzy


Endocrine:  + too cold, + tired/sluggish


Gastrointestinal:  + abdominal pain, + nausea, No diarrhea


Cardiovascular:  No chest pain, No swelling ankles/feet


Respiratory:  No shortness of breath


Male :  + urinary retention, + infections





Physical Exam


Vital Signs:





Vital Signs Past 12 Hours








  Date Time  Temp Pulse Resp B/P (MAP) Pulse Ox O2 Delivery O2 Flow Rate FiO2


 


11/16/17 16:00     95   


 


11/16/17 15:09 37.4 99 18 115/67 (83) 96 Nasal Cannula 2.0 


 


11/16/17 14:45 37.4 100 14 105/63 92   


 


11/16/17 14:30  100 14 105/63 92 Nasal Cannula 2.0 


 


11/16/17 14:00  100 19 110/66 95 Nasal Cannula 2.0 


 


11/16/17 13:31    109/67    


 


11/16/17 13:30     96 Nasal Cannula 2.0 


 


11/16/17 13:30  102 18  96 Nasal Cannula 2.0 


 


11/16/17 13:00  102 18 108/63 98 Nasal Cannula 2.0 


 


11/16/17 12:30  103 18 103/65 97 Nasal Cannula 2.0 


 


11/16/17 12:19  104 18 109/65 96 Nasal Cannula 2.0 


 


11/16/17 12:18    109/65    


 


11/16/17 11:30  108 18 108/66 97 Nasal Cannula 2.0 


 


11/16/17 11:30  108 18 108/66 97   


 


11/16/17 11:10    96/63    


 


11/16/17 11:10  109      


 


11/16/17 11:01     95 Nasal Cannula 2.0 


 


11/16/17 11:00     87 Room Air  


 


11/16/17 11:00 37.4 110 18 96/63 87 Room Air  


 


11/16/17 11:00 37.4 110 18 96/63 87 Room Air  








Physical Exam:


General Appearance:  WD/WN, no apparent distress


Eyes:  bilateral eyes pertinent finding (right eye has cloudy iris and pupil )


ENT:  hearing grossly normal


Neck:  no adenopathy, no JVD, trachea midline


Respiratory/Chest:  no respiratory distress, no accessory muscle use


Genitourinary - Male:  


   Penis:  normal penis


   Urethral Meatus:  normal urethral meatus


   Testes:  normal testes


   Epididymides:  normal epididymides


Extremities:  non-tender, normal inspection, no pedal edema, no calf tenderness


Neurologic/Psychiatric:  alert, normal mood/affect, oriented x 3


Skin:  normal color, warm/dry, no rash





Assessment & Plan


Assessment & Plan


malpositioned wade catheter creating an undrained  pyocystis


Now drained by new properly positioned Wade in ER


broad spectrum antibiotics are on board.


should improve quickly


keep wade indefintely

## 2017-11-17 VITALS
SYSTOLIC BLOOD PRESSURE: 116 MMHG | HEART RATE: 92 BPM | TEMPERATURE: 99.68 F | OXYGEN SATURATION: 93 % | DIASTOLIC BLOOD PRESSURE: 66 MMHG

## 2017-11-17 VITALS
OXYGEN SATURATION: 92 % | DIASTOLIC BLOOD PRESSURE: 62 MMHG | SYSTOLIC BLOOD PRESSURE: 102 MMHG | TEMPERATURE: 99.32 F | HEART RATE: 88 BPM

## 2017-11-17 VITALS
HEART RATE: 89 BPM | SYSTOLIC BLOOD PRESSURE: 131 MMHG | DIASTOLIC BLOOD PRESSURE: 74 MMHG | OXYGEN SATURATION: 93 % | TEMPERATURE: 98.6 F

## 2017-11-17 VITALS
TEMPERATURE: 97.52 F | SYSTOLIC BLOOD PRESSURE: 132 MMHG | DIASTOLIC BLOOD PRESSURE: 76 MMHG | HEART RATE: 98 BPM | OXYGEN SATURATION: 94 %

## 2017-11-17 VITALS
SYSTOLIC BLOOD PRESSURE: 111 MMHG | DIASTOLIC BLOOD PRESSURE: 65 MMHG | TEMPERATURE: 98.96 F | OXYGEN SATURATION: 97 % | HEART RATE: 95 BPM

## 2017-11-17 VITALS
HEART RATE: 94 BPM | DIASTOLIC BLOOD PRESSURE: 67 MMHG | TEMPERATURE: 98.06 F | SYSTOLIC BLOOD PRESSURE: 119 MMHG | OXYGEN SATURATION: 93 %

## 2017-11-17 VITALS
DIASTOLIC BLOOD PRESSURE: 74 MMHG | OXYGEN SATURATION: 94 % | SYSTOLIC BLOOD PRESSURE: 133 MMHG | HEART RATE: 88 BPM | TEMPERATURE: 98.42 F

## 2017-11-17 VITALS
DIASTOLIC BLOOD PRESSURE: 57 MMHG | SYSTOLIC BLOOD PRESSURE: 104 MMHG | OXYGEN SATURATION: 91 % | HEART RATE: 96 BPM | TEMPERATURE: 98.78 F

## 2017-11-17 VITALS
HEART RATE: 95 BPM | DIASTOLIC BLOOD PRESSURE: 70 MMHG | OXYGEN SATURATION: 93 % | TEMPERATURE: 98.78 F | SYSTOLIC BLOOD PRESSURE: 127 MMHG

## 2017-11-17 VITALS
TEMPERATURE: 98.42 F | OXYGEN SATURATION: 92 % | HEART RATE: 96 BPM | DIASTOLIC BLOOD PRESSURE: 78 MMHG | SYSTOLIC BLOOD PRESSURE: 139 MMHG

## 2017-11-17 VITALS — HEART RATE: 93 BPM | SYSTOLIC BLOOD PRESSURE: 123 MMHG | DIASTOLIC BLOOD PRESSURE: 66 MMHG

## 2017-11-17 VITALS — SYSTOLIC BLOOD PRESSURE: 110 MMHG | TEMPERATURE: 99.5 F | HEART RATE: 94 BPM | DIASTOLIC BLOOD PRESSURE: 62 MMHG

## 2017-11-17 VITALS
SYSTOLIC BLOOD PRESSURE: 112 MMHG | OXYGEN SATURATION: 92 % | HEART RATE: 94 BPM | TEMPERATURE: 99.14 F | DIASTOLIC BLOOD PRESSURE: 60 MMHG

## 2017-11-17 VITALS — DIASTOLIC BLOOD PRESSURE: 73 MMHG | TEMPERATURE: 99.32 F | HEART RATE: 94 BPM | SYSTOLIC BLOOD PRESSURE: 131 MMHG

## 2017-11-17 VITALS — TEMPERATURE: 99.14 F | DIASTOLIC BLOOD PRESSURE: 60 MMHG | SYSTOLIC BLOOD PRESSURE: 113 MMHG

## 2017-11-17 VITALS
OXYGEN SATURATION: 92 % | HEART RATE: 94 BPM | DIASTOLIC BLOOD PRESSURE: 65 MMHG | SYSTOLIC BLOOD PRESSURE: 117 MMHG | TEMPERATURE: 97.7 F

## 2017-11-17 VITALS — OXYGEN SATURATION: 92 %

## 2017-11-17 VITALS — SYSTOLIC BLOOD PRESSURE: 107 MMHG | HEART RATE: 92 BPM | TEMPERATURE: 99.5 F | DIASTOLIC BLOOD PRESSURE: 63 MMHG

## 2017-11-17 LAB
ANION GAP SERPL CALC-SCNC: 9 MMOL/L (ref 3–11)
BUN SERPL-MCNC: 39 MG/DL (ref 7–18)
BUN/CREAT SERPL: 18.3 (ref 10–20)
CALCIUM SERPL-MCNC: 8.2 MG/DL (ref 8.5–10.1)
CHLORIDE SERPL-SCNC: 105 MMOL/L (ref 98–107)
CO2 SERPL-SCNC: 25 MMOL/L (ref 21–32)
CREAT CL PREDICTED SERPL C-G-VRATE: 34.8 ML/MIN
CREAT SERPL-MCNC: 2.14 MG/DL (ref 0.6–1.4)
EOSINOPHIL NFR BLD AUTO: 209 K/UL (ref 130–400)
EST. AVERAGE GLUCOSE BLD GHB EST-MCNC: 272 MG/DL
GLUCOSE SERPL-MCNC: 65 MG/DL (ref 70–99)
HCT VFR BLD CALC: 22.9 % (ref 42–52)
HCT VFR BLD CALC: 29 % (ref 42–52)
INR PPP: 1.1 (ref 0.9–1.1)
MCH RBC QN AUTO: 26.7 PG (ref 25–34)
MCHC RBC AUTO-ENTMCNC: 30.6 G/DL (ref 32–36)
MCV RBC AUTO: 87.4 FL (ref 80–100)
PMV BLD AUTO: 9.2 FL (ref 7.4–10.4)
POTASSIUM SERPL-SCNC: 4.4 MMOL/L (ref 3.5–5.1)
PROTHROMBIN TIME: 12.3 SECONDS (ref 9–12)
RBC # BLD AUTO: 2.62 M/UL (ref 4.7–6.1)
SODIUM SERPL-SCNC: 139 MMOL/L (ref 136–145)
WBC # BLD AUTO: 13.87 K/UL (ref 4.8–10.8)

## 2017-11-17 RX ADMIN — FINASTERIDE SCH MG: 5 TABLET, FILM COATED ORAL at 08:49

## 2017-11-17 RX ADMIN — VANCOMYCIN HYDROCHLORIDE SCH MLS/HR: 1 INJECTION, POWDER, LYOPHILIZED, FOR SOLUTION INTRAVENOUS at 13:29

## 2017-11-17 RX ADMIN — HEPARIN SODIUM SCH UNIT: 10000 INJECTION, SOLUTION INTRAVENOUS; SUBCUTANEOUS at 08:59

## 2017-11-17 RX ADMIN — ALBUTEROL SULFATE SCH PUFFS: 90 AEROSOL, METERED RESPIRATORY (INHALATION) at 20:59

## 2017-11-17 RX ADMIN — SODIUM CHLORIDE SCH MLS/HR: 900 INJECTION, SOLUTION INTRAVENOUS at 11:00

## 2017-11-17 RX ADMIN — INSULIN ASPART SCH UNITS: 100 INJECTION, SOLUTION INTRAVENOUS; SUBCUTANEOUS at 00:00

## 2017-11-17 RX ADMIN — MUPIROCIN SCH APPLN: 20 OINTMENT TOPICAL at 11:00

## 2017-11-17 RX ADMIN — INSULIN ASPART SCH UNITS: 100 INJECTION, SOLUTION INTRAVENOUS; SUBCUTANEOUS at 13:39

## 2017-11-17 RX ADMIN — TAMSULOSIN HYDROCHLORIDE SCH MG: 0.4 CAPSULE ORAL at 20:59

## 2017-11-17 RX ADMIN — SODIUM CHLORIDE SCH MLS/HR: 900 INJECTION, SOLUTION INTRAVENOUS at 21:04

## 2017-11-17 RX ADMIN — INSULIN ASPART SCH UNITS: 100 INJECTION, SOLUTION INTRAVENOUS; SUBCUTANEOUS at 21:00

## 2017-11-17 RX ADMIN — AMLODIPINE BESYLATE SCH MG: 5 TABLET ORAL at 08:49

## 2017-11-17 RX ADMIN — GABAPENTIN SCH MG: 100 CAPSULE ORAL at 08:48

## 2017-11-17 RX ADMIN — INSULIN ASPART SCH UNITS: 100 INJECTION, SOLUTION INTRAVENOUS; SUBCUTANEOUS at 03:36

## 2017-11-17 RX ADMIN — ALBUTEROL SULFATE SCH PUFFS: 90 AEROSOL, METERED RESPIRATORY (INHALATION) at 17:40

## 2017-11-17 RX ADMIN — ATORVASTATIN CALCIUM SCH MG: 10 TABLET, FILM COATED ORAL at 08:48

## 2017-11-17 RX ADMIN — INSULIN ASPART SCH UNITS: 100 INJECTION, SOLUTION INTRAVENOUS; SUBCUTANEOUS at 17:50

## 2017-11-17 RX ADMIN — INSULIN ASPART SCH UNITS: 100 INJECTION, SOLUTION INTRAVENOUS; SUBCUTANEOUS at 21:17

## 2017-11-17 RX ADMIN — INSULIN ASPART SCH UNITS: 100 INJECTION, SOLUTION INTRAVENOUS; SUBCUTANEOUS at 08:57

## 2017-11-17 RX ADMIN — ALBUTEROL SULFATE SCH PUFFS: 90 AEROSOL, METERED RESPIRATORY (INHALATION) at 13:29

## 2017-11-17 RX ADMIN — ALBUTEROL SULFATE SCH PUFFS: 90 AEROSOL, METERED RESPIRATORY (INHALATION) at 08:49

## 2017-11-17 NOTE — ECHOCARDIOGRAM REPORT
*NOTICE TO RECEIVING PARTY AGENCY**  This information is strictly Confidential and protected under 
[place] law.  [place] law prohibits you from making any further disclosure of this 
information unless further disclosure is expressly permitted by the written consent of the person to 
whom it pertains or is authorized by law.  A general authorization for the release of medical or 
other information is not sufficient for this purpose.  Hospital accepts no responsibility if the 
information is made available to any other person, INCLUDING THE PATIENT.



Interpretation Summary

  *  Name: DORIS MCKNIGHT  Study Date: 2017 12:03 PM  BP: 139/78 mmHg

  *  MRN: [id number]  Patient Location: C.2E\S\E205\S\1  HR: 96

  *  : 1945 (M/[birth year]/yyyy)  Gender: Male  Height: 72 in

  *  Age: 72 yrs  Ethnicity: [place]  Weight: 174 lb

  *  Ordering Physician: Avi Garcia

  *  Referring Physician: Self, Referred

  *  Performed By: Evelyn Watson RDCS

  *  Accession# [id number]  Account# [id number]

  *  Reason For Study: POSSIBLE CHF

  *  BSA: 2.0 m2

  *  -- Conclusions --

  *  Normal LV chamber size with mild concentric LVH.

  *  Normal LV systolic function, EF 55-60%.

  *  No segmental left ventricular wall motion abnormalities are noted.

  *  Grade II diastolic dysfunction.

  *  Mild mitral regurgitation.

Procedure Details

  *  A complete two-dimensional transthoracic echocardiogram was performed (2D, M-mode, Doppler and 
color flow Doppler).

Left Ventricle

  *  The left ventricle is normal in size.

  *  There is mild concentric left ventricular hypertrophy.

  *  Ejection Fraction = 55-60%.

  *  Left ventricular systolic function is normal.

  *  No segmental left ventricular wall motion abnormalities are noted.

  *  The left ventricular wall motion is normal.

Right Ventricle

  *  The right ventricular cavity size is normal (basal dimension <4.2 cm in right ventricular 
apical 4-chamber view).

  *  The right ventricular systolic function is normal as assessed by tricuspid annular plane 
systolic excursion (TAPSE) (normal >1.5 cm).

Atria

  *  The left atrial size is normal.

  *  Right atrial size is normal.

  *  No ASD detected; PFO is not assessed.

Mitral Valve

  *  The mitral valve anatomy is normal.

  *  There is no mitral valve stenosis.

  *  There is mild mitral regurgitation.

Tricuspid Valve

  *  The tricuspid valve is normal in structure and function.

Aortic Valve

  *  The aortic valve is normal in structure and function.

Pulmonic Valve

  *  The pulmonary valve is not well seen, but the Doppler examination is normal without significant 
regurgitation or stenosis.

Great Vessels

  *  The aortic root is normal size.

Pericardium/Pleural

  *  There is no pericardial effusion.

Left Ventricular Diastolic Function

  *  Diastolic dysfunction, Grade II (pseudonormalization pattern).



MMode 2D Measurements and Calculations

IVSd 1.3 cm

IVSs 1.5 cm



LVIDd 4.4 cm

LVIDs 3.2 cm

LVPWd 1.6 cm

LVPWs 2.1 cm



IVS/LVPW 0.83 

FS 28.5 %

EDV(Teich) 88.7 ml

ESV(Teich) 39.8 ml

EF(Teich) 55.2 %



EDV(cubed) 86.5 ml

ESV(cubed) 31.6 ml

EF(cubed) 63.4 %

% IVS thick 14.9 %

% LVPW thick 33.9 %





LV mass(C)d 252.7 grams

LV mass(C)dI 125.8 grams/m\S\2

LV mass(C)s 232.6 grams

LV mass(C)sI 115.8 grams/m\S\2



SV(Teich) 48.9 ml

SI(Teich) 24.4 ml/m\S\2

SV(cubed) 54.9 ml

SI(cubed) 27.3 ml/m\S\2



Ao root diam 3.4 cm

Ao root area 8.9 cm\S\2

LA dimension 3.8 cm



LA/Ao 1.1 





LVAd ap4 29.8 cm\S\2

LVLd ap4 7.9 cm

EDV(MOD-sp4) 93.2 ml

EDV(sp4-el) 95.1 ml

LVAs ap4 18.1 cm\S\2

LVLs ap4 6.5 cm

ESV(MOD-sp4) 42.4 ml

ESV(sp4-el) 42.9 ml

EF(MOD-sp4) 54.5 %

EF(sp4-el) 54.9 %



LVAd ap2 33.4 cm\S\2

LVLd ap2 8.3 cm

EDV(MOD-sp2) 112.3 ml

EDV(sp2-el) 113.4 ml

LVAs ap2 20.7 cm\S\2

LVLs ap2 7.2 cm

ESV(MOD-sp2) 49.0 ml

ESV(sp2-el) 50.5 ml

EF(MOD-sp2) 56.4 %

EF(sp2-el) 55.5 %



LVLd %diff 5.1 %

EDV(MOD-bp) 101.8 ml

LVLs %diff 10.1 %

ESV(MOD-bp) 48.5 ml

EF(MOD-bp) 52.3 %



SV(MOD-sp4) 50.8 ml

SI(MOD-sp4) 25.3 ml/m\S\2





SV(MOD-sp2) 63.3 ml

SI(MOD-sp2) 31.5 ml/m\S\2



SV(MOD-bp) 53.2 ml

SI(MOD-bp) 26.5 ml/m\S\2



SV(sp4-el) 52.2 ml

SI(sp4-el) 26.0 ml/m\S\2



SV(sp2-el) 62.9 ml

SI(sp2-el) 31.3 ml/m\S\2







Doppler Measurements and Calculations

MV A max kwabena 111.9 cm/sec



MV P1/2t max kwabena 128.3 cm/sec

MV dec time 0.17 sec



Ao V2 max 143.5 cm/sec

Ao max PG 8.2 mmHg

Ao max PG (full) 3.2 mmHg



LV V1 max PG 5.1 mmHg





LV V1 max 112.7 cm/sec



TR max kwabena 290.2 cm/sec

## 2017-11-17 NOTE — CLINICAL DOCUMENTATION QUERY
QUERY 1 OF 2            **** CLINICAL DOCUMENTATION QUERY****



Dr. THOMAS, 



In your clinical opinion is this patient being managed for:

    

                        (  ) UTI due to malpositioned wade catheter

                        (  ) Not Agree



                        (  ) Other explanation of clinical findings (Please Explain)

                        (  ) Unable to determine (Please Define)

                        (  ) Need to Discuss

                        



The medical record reflects the following clinical findings, treatment, and risk factors.  
  



Clinical Indicators:73 yo male presenting with Sepsis and UTI. Noted to have a chronic 
wade catheter for the past month.

Treatment: IV vancomycin, IV levaquin, IV fluid boluses, tele, urine cx pending, urology 
consult, wade exchange

Risk Factors:chronic wade, DM

______________________________________________

QUERY 2 OF 2

In your clinical opinion is this patient being managed for:

    

                        (  ) Metabolic encephalopathy

                        (  ) Not Agree



                        (  ) Other explanation of clinical findings (Please Explain)

                        (  ) Unable to determine (Please Define)

                        (  ) Need to Discuss

                        



The medical record reflects the following clinical findings, treatment, and risk factors.  
  



Clinical Indicators: Pt presented with reported confusion. CT head without acute findings. 
 Na 133, BUN 40, Cr 2.17, glucose 322, O2 sat 87% on RA

Treatment: IV fluid boluses, IV levaquin, IV vancomycin, O2 support, tele, pending blood 
and urine cx

Risk Factors: sepsis, UTI, pneumonia



Please clarify and document your clinical opinion in the progress notes and discharge 
summary. Terms such as "probable", "suspected", "likely", "questionable", "possible", or 
"still to be ruled out" are acceptable. 



*****IF IN AGREEMENT, YOU MUST DOCUMENT ABOVE DIAGNOSTIC STATEMENT IN DAILY PROGRESS NOTES 
AND DISCHARGE SUMMARY. This document is not part of the patient's record.*****



Thank You, Cassie Can, [phone]

## 2017-11-17 NOTE — PHARMACY PROGRESS NOTE
Glycemic Control Intl Consult


Date of Service


Nov 17, 2017.





Scope


Glycemic Pharmacist consulted by JOANNA Davis on 11/16/17 for glycemic control 

and to write orders per [facility] inpatient glycemic control protocol





Objective


Weight (Kilograms):  79.300


Accuchecks BSG (last 24hrs):











Test


  11/16/17


10:44 11/16/17


17:15 11/16/17


17:48 11/16/17


20:33


 


Random Glucose


  322 mg/dl


(70-99) 


  221 mg/dl


(70-99) 


 


 


Bedside Glucose


  


  219 mg/dl


(70-99) 


  109 mg/dl


(70-99)


 


Test


  11/17/17


00:15 11/17/17


03:30 11/17/17


05:53 11/17/17


07:03


 


Bedside Glucose


  103 mg/dl


(70-99) 74 mg/dl


(70-99) 


  72 mg/dl


(70-99)


 


Random Glucose


  


  


  65 mg/dl


(70-99) 


 








Laboratory Data (last 24hrs)











Test


  11/16/17


10:44 11/16/17


17:48 11/17/17


05:53


 


Anion Gap 8.0 mmol/L  7.0 mmol/L  9.0 mmol/L 


 


BUN/Creatinine Ratio 18.6  18.7  18.3 


 


Blood Urea Nitrogen 40 mg/dl  42 mg/dl  39 mg/dl 


 


Creatinine 2.17 mg/dl  2.22 mg/dl  2.14 mg/dl 


 


Potassium Level 4.8 mmol/L  5.1 mmol/L  4.4 mmol/L 


 


Sodium Level 133 mmol/L  136 mmol/L  139 mmol/L 


 


White Blood Count 25.75 K/uL  20.66 K/uL  13.87 K/uL 


 


Red Blood Count 2.93 M/uL   


 


Hemoglobin 7.8 g/dL   


 


Hematocrit 25.5 %   


 


Mean Corpuscular Volume 87.0 fL   


 


Mean Corpuscular Hemoglobin 26.6 pg   


 


Mean Corpuscular Hemoglobin


Concent 30.6 g/dl 


  


  


 


 


Platelet Count 265 K/uL   


 


Mean Platelet Volume 9.6 fL   


 


Neutrophils (%) (Auto) 93.1 %   


 


Lymphocytes (%) (Auto) 1.8 %   


 


Monocytes (%) (Auto) 4.3 %   


 


Eosinophils (%) (Auto) 0.1 %   


 


Basophils (%) (Auto) 0.1 %   


 


Neutrophils # (Auto) 23.97 K/uL   


 


Lymphocytes # (Auto) 0.47 K/uL   


 


Monocytes # (Auto) 1.11 K/uL   


 


Eosinophils # (Auto) 0.02 K/uL   


 


Basophils # (Auto) 0.02 K/uL   


 


Hemoglobin A1c   11.1 % 








HbA1c











Test


  11/17/17


05:53


 


Hemoglobin A1c


  11.1 %


(4.5-5.6)  H











Recent Pertinent Medications


Outpatient Anti-diabetic Regimen: 


* Lantus 40 units QD


* NPH sliding scale?


* Total estimated outpatient insulin/day = ~80 units?


* A1c = 15%  9/12/17








The patient is currently receiving:


* Basal insulin:              Lantus 20 units every 12 hours





* Correctional Insulin:     Novolog Correction per scale ACHS


                            Goal Range: Low 140 mg/dL - High 180 mg/dL


                            Correction Factor: 20 mg/dL/unit





* Prandial insulin:           Per carb ratio of 1 unit per 7 grams CHO consumed








Risk Factors for Insulin Resistance:


* Infection


* Diet





Assessment & Plan


ASSESSMENT:


* 71 yo M known to glycemic service from prior admission this past September, 

back in with sepsis secondary to UTI vs Pneumonia


 * During September admission, A1c found to be 15% and pt discharged with 

insulin


 * A1c from this AM is 11.1% which is a great improvement from last admission


* BSGs 219 mg/dL in the evening at dinner when pharmacy received consult


 * A tight Novolog ACHS added and pt corrected down to 109 mg/dL


 * Lantus 20 units given HS


* Fasting BSG this AM 72 mg/dL


 * My plan will be to reduce basal insulin from q12 to HS only based on BSG 

trend


 * Change Novolog to match parameters that worked during past admission





PLAN FOR INPATIENT GLYCEMIC CONTROL:





* Basal insulin with LANTUS 20 units SQ HS





* Correctional Insulin with NOVOLOG per scale ACHS


 * Goal Range:  Low 110 mg/dL - High 140 mg/dL


 * Correction Factor: 25 mg/dL/unit


 * Nutritional / Prandial insulin per carb ratio of 1 unit per 8 grams CHO 

consumed





* Please note that the plan above was derived based on current level of insulin 

resistance and hospital stress. These recommendations are appropriate for 

inpatient admission only. Plan of care upon discharge will need to be 

reassessed to avoid potential outpatient hypo/hyperglycemia. 





Thank you.

## 2017-11-17 NOTE — DIAGNOSTIC IMAGING REPORT
(CHEST) THORAX WITHOUT



CT DOSE: 434.51 mGy.cm



HISTORY: Dyspnea. Infection.  r/o pneumonia



TECHNIQUE: Multiaxial CT images of the chest were performed without contrast.  A

dose lowering technique was utilized adhering to the principles of ALARA.





COMPARISON: None.



FINDINGS: Mild bibasilar atelectasis. Slight basilar interstitial prominence. No

well-defined focal infiltrate.



Moderate abscess chronic change and ectasia thoracic aorta. No evidence for

aneurysm.



IMPRESSION: 



1. Mild chronic basilar interstitial and atelectatic change.

2. No focal infiltrate









The above report was generated using voice recognition software.  It may contain

grammatical, syntax or spelling errors.







Electronically signed by:  John Mane M.D.

11/17/2017 12:59 PM



Dictated Date/Time:  11/17/2017 12:54 PM

## 2017-11-17 NOTE — DIAGNOSTIC IMAGING REPORT
[facility]



HISTORY:      Abdominal distention.



COMPARISON: [facility] 11/16/2017.



FINDINGS: Large amount well-formed stool seen throughout the colon. Few

nondilated gas-filled loops of small bowel within the right side the abdomen.

These have improved. Nondistended gas-filled stomach.  No renal calculi. No

ureteral calculi. No pneumoperitoneum or pneumatosis.



IMPRESSION:  



1. Improvement in the nondilated gas-filled loops of small bowel within the

abdomen.

2. Large amount of well-formed stool seen throughout the colon.







Electronically signed by:  Devon Ramirez M.D.

11/17/2017 1:25 PM



Dictated Date/Time:  11/17/2017 1:22 PM

## 2017-11-17 NOTE — PROGRESS NOTE
Medicine Progress Note


Date & Time of Visit:


Nov 17, 2017 at 10:57.


Subjective


patient seen resting in bed, comfortable


states he feels improved compared to yesterday


penile pain resolved, still has some blood oozing from urethra/wade


denies abdominal pain, fever/chills





denies dyspnea, has occasional dry cough





no chest pain, dizziness, weakness, palpitations





no other symptoms





Objective





Last 8 Hrs








  Date Time  Temp Pulse Resp B/P (MAP) Pulse Ox O2 Delivery O2 Flow Rate FiO2


 


11/17/17 07:38 36.4 98 20 132/76 (94) 94 Room Air  


 


11/17/17 07:30      Room Air  


 


11/17/17 04:07 37.4 88 18 102/62 (75) 92 Room Air  


 


11/17/17 04:00      Room Air  








Physical Exam:


General- oriented x 2, not in distress, speaks in sentences with no effort





Head-  atraumatic





Eyes- PERRL, EOMI, anicteric





ENT- oropharynx clear





Neck- supple, no JVD, no adenopathy, no thyromegaly





Lungs- clear to auscultation bilaterally





Heart- regular rhythm; no murmur, normal rate





Abdomen- normal bowel sounds, soft, nontender


       (+) wade cath in place- some tinge of blood near the urethra/wade





Extremities- no pretibial edema, no calf tenderness; peripheral pulses intact





Neuro- alert, oriented x 3; PERRL, EOMI; no facial palsy; no dysarthria; motor 5

/5 bilaterally; sensation 100% ; no other gross focal neuro deficits





Skin- warm & dry


Laboratory Results:





Last 24 Hours








Test


  11/16/17


11:45 11/16/17


14:20 11/16/17


17:00 11/16/17


17:15


 


Bedside Lactic Acid Venous 1.09 mmol/L    


 


Urine Color  YELLOW   


 


Urine Appearance  CLOUDY   


 


Urine pH  5.0   


 


Urine Specific Gravity  1.015   


 


Urine Protein  1+   


 


Urine Glucose (UA)  2+   


 


Urine Ketones  NEG   


 


Urine Occult Blood  3+   


 


Urine Nitrite  POS   


 


Urine Bilirubin  NEG   


 


Urine Urobilinogen  NEG   


 


Urine Leukocyte Esterase  LARGE   


 


Urine WBC (Auto)  >30 /hpf   


 


Urine RBC (Auto)  >30 /hpf   


 


Urine Hyaline Casts (Auto)  0 /lpf   


 


Urine Epithelial Cells (Auto)  0-5 /lpf   


 


Urine Bacteria (Auto)  2+   


 


Urine Pathogenic Casts   /lpf   


 


Influenza Type A (RT-PCR)


  


  


  Neg for Influ


A 


 


 


Influenza Type B (RT-PCR)


  


  


  Neg for Influ


B 


 


 


Bedside Glucose    219 mg/dl 


 


Test


  11/16/17


17:48 11/16/17


20:33 11/17/17


00:15 11/17/17


03:30


 


White Blood Count 20.66 K/uL    


 


Red Blood Count 2.79 M/uL    


 


Hemoglobin 7.6 g/dL    


 


Hematocrit 24.2 %    


 


Mean Corpuscular Volume 86.7 fL    


 


Mean Corpuscular Hemoglobin 27.2 pg    


 


Mean Corpuscular Hemoglobin


Concent 31.4 g/dl 


  


  


  


 


 


RDW Standard Deviation 44.6 fL    


 


RDW Coefficient of Variation 14.1 %    


 


Platelet Count 227 K/uL    


 


Mean Platelet Volume 8.6 fL    


 


Sodium Level 136 mmol/L    


 


Potassium Level 5.1 mmol/L    


 


Chloride Level 105 mmol/L    


 


Carbon Dioxide Level 24 mmol/L    


 


Anion Gap 7.0 mmol/L    


 


Blood Urea Nitrogen 42 mg/dl    


 


Creatinine 2.22 mg/dl    


 


Est Creatinine Clear Calc


Drug Dose 31.3 ml/min 


  


  


  


 


 


Estimated GFR (


American) 33.3 


  


  


  


 


 


Estimated GFR (Non-


American 28.7 


  


  


  


 


 


BUN/Creatinine Ratio 18.7    


 


Random Glucose 221 mg/dl    


 


Lactic Acid Level 1.4 mmol/L    


 


Calcium Level 7.8 mg/dl    


 


Bedside Glucose  109 mg/dl  103 mg/dl  74 mg/dl 


 


Test


  11/17/17


05:53 11/17/17


07:03 


  


 


 


White Blood Count 13.87 K/uL    


 


Red Blood Count 2.62 M/uL    


 


Hemoglobin 7.0 g/dL    


 


Hematocrit 22.9 %    


 


Mean Corpuscular Volume 87.4 fL    


 


Mean Corpuscular Hemoglobin 26.7 pg    


 


Mean Corpuscular Hemoglobin


Concent 30.6 g/dl 


  


  


  


 


 


RDW Standard Deviation 45.3 fL    


 


RDW Coefficient of Variation 14.1 %    


 


Platelet Count 209 K/uL    


 


Mean Platelet Volume 9.2 fL    


 


Prothrombin Time 12.3 SECONDS    


 


Prothromb Time International


Ratio 1.1 


  


  


  


 


 


Sodium Level 139 mmol/L    


 


Potassium Level 4.4 mmol/L    


 


Chloride Level 105 mmol/L    


 


Carbon Dioxide Level 25 mmol/L    


 


Anion Gap 9.0 mmol/L    


 


Blood Urea Nitrogen 39 mg/dl    


 


Creatinine 2.14 mg/dl    


 


Est Creatinine Clear Calc


Drug Dose 34.8 ml/min 


  


  


  


 


 


Estimated GFR (


American) 34.8 


  


  


  


 


 


Estimated GFR (Non-


American 30.0 


  


  


  


 


 


BUN/Creatinine Ratio 18.3    


 


Random Glucose 65 mg/dl    


 


Estimated Average Glucose 272 mg/dl    


 


Hemoglobin A1c 11.1 %    


 


Calcium Level 8.2 mg/dl    


 


Bedside Glucose  72 mg/dl   














 Date/Time


Source Procedure


Growth Status


 


 


 11/16/17 11:46


Blood Blood Culture


Pending Received


 


 11/16/17 11:41


Blood Blood Culture


Pending Received


 


 11/16/17 17:00


Nasal MRSA DNA Surveillance Screen - Final


Specimen Positive for MRSA by DNA Probe Complete











Assessment & Plan


This is a 73yo M who resides at [facility] with a PMH of DM II (uncontrolled)

, HTN, HLD, CKD III and urinary retention (with a wade) who presents with 

dysuria, penile pain and generalized weakness.





Sepsis 2/2 UTI, PNA: 


-Initially hypotensive at 93/63, tachycardic at 110 


-BP normotensive, HR improved after IVF resuscitation 


-Leukocytosis of 25, POC Lactate of 1.09


-- now afebrile


   leukocytosis improving


   continue IV fluids


-- management of UTI and possible PNA as noted below





UTI


Chronic Urinary Retention, Chronic Wade, BPH


-UA with large amount of leuk esterase, nitrite 


-Likely 2/2 urinary retention, wade in place long term 


-- Nasal MRSA:


   Positive


   Urine culture:


   pending


   Blood Cultures:


   pending


-- empiric Vanco + Levaquin Day 2


   Wade cath replaced


   appreciate Dr. Greer' input





Possible Pneumonia 


- vs. CHF


- check CT chest


- Sputum culture: 


   pending


- check Echo





Hypoxia 2/2 PNA: 


-Hypoxic in ER in high 80s on RA


-CXR with bilateral interstitial process vs. pulm vasc congestion 


-O2 saturation improved to high 90s on 2L NC


-Does not require home O2


-Continue supplemental O2 per protocol 





-- lung sounds clear


   CT chest today


   wean off oxygen accordingly





DEANNE on CKD III: 


-Cr elevated to 2.17 (baseline is 1.2)


-Likely multifactorial: poor PO intake in addition to possible obstruction 2/2 

BPH, urinary retention 


-Denies NSAID use





-- likely from Sepsis, Post Renal- Obstruction


-- IV fluids 


   monitor crea





Acute Blood Loss Anemia on Chronic anemia: 


-- Hg down to 7.0


   likely from Hematuria


-- Hemoccult pending


-- will transfuse 2 units PRBC


   monitor Hg


-- check Anemia panel





DM II: 


-Hgb a1c of 9/6 on 2/17


-Recheck hgb a1c


-BSG of 322 in ER 


-Hold home regimen 


-Basal bolus regimen while in-patient 


-Pharm consult due to fluctuating BSG at nursing home 





HTN: 


-Normotensive 


-Cont home amlodipine with hold parameters 





HLD: 


-Continue statin





DVT Ppx: SCDs only in light of hematuria, anemia





Code status: FULL 





PCP: Marci 





Dispo: NIKKI consulted to help with discharge placement back to [facility]


Current Inpatient Medications:





Current Inpatient Medications








 Medications


  (Trade)  Dose


 Ordered  Sig/Erika


 Route  Start Time


 Stop Time Status Last Admin


Dose Admin


 


 Acetaminophen


  (Tylenol Tab)  650 mg  Q4H  PRN


 PO  11/16/17 14:00


 12/16/17 13:59   


 


 


 Ondansetron HCl


  (Zofran Inj)  4 mg  Q6H  PRN


 IV  11/16/17 14:00


 12/16/17 13:59   


 


 


 Insulin Aspart


  (novoLOG ASPART)  **SLIDING


 SCALE**


 **If C...  ACHS


 SC  11/16/17 16:00


 12/16/17 15:59  11/17/17 08:57


3 UNITS


 


 Glucose


  (Glucose 40% Gel)  15-30


 GRAMS 15


 GRAMS...  UD  PRN


 PO  11/16/17 14:00


 12/16/17 13:59   


 


 


 Glucose


  (Glucose Chew


 Tab)  4-8


 Tablets 4


 Tabl...  UD  PRN


 PO  11/16/17 14:00


 12/16/17 13:59   


 


 


 Dextrose


  (Dextrose 50%


 50ML Syringe)  25-50ML OF


 50% DW IV


 FOR...  UD  PRN


 IV  11/16/17 14:00


 12/16/17 13:59   


 


 


 Glucagon


  (Glucagon Inj)  1 mg  UD  PRN


 SQ  11/16/17 14:00


 12/16/17 13:59   


 


 


 Amlodipine


 Besylate


  (Norvasc Tab)  5 mg  DAILY


 PO  11/17/17 09:00


 12/17/17 08:59  11/17/17 08:49


5 MG


 


 Atorvastatin


 Calcium


  (Lipitor Tab)  10 mg  DAILY


 PO  11/17/17 09:00


 12/17/17 08:59  11/17/17 08:48


10 MG


 


 Finasteride


  (Proscar Tab)  5 mg  QAM


 PO  11/17/17 09:00


 12/17/17 08:59  11/17/17 08:49


5 MG


 


 Gabapentin


  (Neurontin Cap)  100 mg  DAILY


 PO  11/17/17 09:00


 12/17/17 08:59  11/17/17 08:48


100 MG


 


 Tamsulosin HCl


  (Flomax Cap)  0.4 mg  HS


 PO  11/16/17 21:00


 12/16/17 20:59  11/16/17 21:37


0.4 MG


 


 Levofloxacin


  (Consult)  1 ea  UD  PRN


 N/A  11/16/17 14:45


 12/16/17 14:44   


 


 


 Vancomycin HCl


  (Consult)  1 ea  UD  PRN


 N/A  11/16/17 14:45


 12/16/17 14:44   


 


 


 Vancomycin HCl


 1250 mg/Sodium


 Chloride  275 ml @ 


 200 mls/hr  Q24H


 IV  11/17/17 12:00


 11/24/17 11:59   


 


 


 Levofloxacin 750


 mg/Prmx  150 ml @ 


 100 mls/hr  Q48H


 IV  11/18/17 14:00


 11/25/17 13:59   


 


 


 Albuterol


  (Ventolin Hfa


 Inhaler)  2 puffs  QID


 INH  11/16/17 17:00


 12/16/17 16:59  11/17/17 08:49


2 PUFFS


 


 Miscellaneous


 Information


  (Consult


 Glycemic


 Management


 Pharmacy)  1 ea  UD


 N/A  11/16/17 15:42


 12/16/17 15:41   


 


 


 Sodium Chloride  1,000 ml @ 


 100 mls/hr  Q10H


 IV  11/17/17 10:45


 12/17/17 10:44 UNV  


 


 


 Mupirocin


  (Bactroban 2%


 Oint)  1 appln  DAILY


 EXT  11/18/17 09:00


 12/18/17 08:59 UNV  


 


 


 Senna/Docusate


 Sodium


  (Senokot S Tab)  1 tab  QAM


 PO  11/18/17 09:00


 12/18/17 08:59 UNV  


 


 


 Senna/Docusate


 Sodium


  (Senokot S Tab)  1 tab  ONE


 PO  11/17/17 11:00


 12/17/17 10:59 UNV  


 


 


 Polyethylene


  (Miralax Powder


 Packet)  17 gm  DAILY  PRN


 PO  11/17/17 11:00


 12/17/17 10:59 UNV  


 


 


 Bisacodyl


  (Dulcolax Supp)  10 mg  DAILY  PRN


 ND  11/17/17 11:00


 12/17/17 10:59 UNV

## 2017-11-18 VITALS — OXYGEN SATURATION: 95 %

## 2017-11-18 VITALS
TEMPERATURE: 97.88 F | OXYGEN SATURATION: 93 % | HEART RATE: 89 BPM | SYSTOLIC BLOOD PRESSURE: 143 MMHG | DIASTOLIC BLOOD PRESSURE: 79 MMHG

## 2017-11-18 VITALS — TEMPERATURE: 99.32 F | OXYGEN SATURATION: 94 % | SYSTOLIC BLOOD PRESSURE: 126 MMHG | DIASTOLIC BLOOD PRESSURE: 73 MMHG

## 2017-11-18 VITALS
SYSTOLIC BLOOD PRESSURE: 155 MMHG | HEART RATE: 90 BPM | OXYGEN SATURATION: 91 % | DIASTOLIC BLOOD PRESSURE: 86 MMHG | TEMPERATURE: 97.7 F

## 2017-11-18 VITALS
SYSTOLIC BLOOD PRESSURE: 138 MMHG | OXYGEN SATURATION: 91 % | HEART RATE: 92 BPM | TEMPERATURE: 98.06 F | DIASTOLIC BLOOD PRESSURE: 84 MMHG

## 2017-11-18 VITALS
SYSTOLIC BLOOD PRESSURE: 150 MMHG | TEMPERATURE: 97.88 F | DIASTOLIC BLOOD PRESSURE: 88 MMHG | HEART RATE: 89 BPM | OXYGEN SATURATION: 94 %

## 2017-11-18 VITALS
OXYGEN SATURATION: 95 % | TEMPERATURE: 98.78 F | HEART RATE: 89 BPM | DIASTOLIC BLOOD PRESSURE: 82 MMHG | SYSTOLIC BLOOD PRESSURE: 144 MMHG

## 2017-11-18 LAB
ANION GAP SERPL CALC-SCNC: 8 MMOL/L (ref 3–11)
BASOPHILS # BLD: 0.02 K/UL (ref 0–0.2)
BASOPHILS NFR BLD: 0.2 %
BUN SERPL-MCNC: 32 MG/DL (ref 7–18)
BUN/CREAT SERPL: 19.1 (ref 10–20)
CALCIUM SERPL-MCNC: 8.4 MG/DL (ref 8.5–10.1)
CHLORIDE SERPL-SCNC: 110 MMOL/L (ref 98–107)
CO2 SERPL-SCNC: 25 MMOL/L (ref 21–32)
COMPLETE: YES
CREAT CL PREDICTED SERPL C-G-VRATE: 44.5 ML/MIN
CREAT SERPL-MCNC: 1.67 MG/DL (ref 0.6–1.4)
EOSINOPHIL NFR BLD AUTO: 230 K/UL (ref 130–400)
FERRITIN SERPL-MCNC: 495.4 NG/ML (ref 8–388)
GLUCOSE SERPL-MCNC: 54 MG/DL (ref 70–99)
HCT VFR BLD CALC: 30.7 % (ref 42–52)
IG%: 0.5 %
IMM GRANULOCYTES NFR BLD AUTO: 16.4 %
LYMPHOCYTES # BLD: 1.63 K/UL (ref 1.2–3.4)
MCH RBC QN AUTO: 26.9 PG (ref 25–34)
MCHC RBC AUTO-ENTMCNC: 30.9 G/DL (ref 32–36)
MCV RBC AUTO: 87 FL (ref 80–100)
MONOCYTES NFR BLD: 10.1 %
NEUTROPHILS # BLD AUTO: 3.4 %
NEUTROPHILS NFR BLD AUTO: 69.4 %
PMV BLD AUTO: 9.4 FL (ref 7.4–10.4)
POTASSIUM SERPL-SCNC: 4.2 MMOL/L (ref 3.5–5.1)
RBC # BLD AUTO: 3.53 M/UL (ref 4.7–6.1)
SODIUM SERPL-SCNC: 143 MMOL/L (ref 136–145)
TIBC SERPL-MCNC: 162 MCG/DL (ref 250–450)
WBC # BLD AUTO: 9.95 K/UL (ref 4.8–10.8)

## 2017-11-18 RX ADMIN — INSULIN ASPART SCH UNITS: 100 INJECTION, SOLUTION INTRAVENOUS; SUBCUTANEOUS at 21:47

## 2017-11-18 RX ADMIN — SODIUM CHLORIDE SCH MLS/HR: 900 INJECTION, SOLUTION INTRAVENOUS at 07:30

## 2017-11-18 RX ADMIN — TAMSULOSIN HYDROCHLORIDE SCH MG: 0.4 CAPSULE ORAL at 20:04

## 2017-11-18 RX ADMIN — ALBUTEROL SULFATE SCH PUFFS: 90 AEROSOL, METERED RESPIRATORY (INHALATION) at 20:04

## 2017-11-18 RX ADMIN — ATORVASTATIN CALCIUM SCH MG: 10 TABLET, FILM COATED ORAL at 07:31

## 2017-11-18 RX ADMIN — ALBUTEROL SULFATE SCH PUFFS: 90 AEROSOL, METERED RESPIRATORY (INHALATION) at 12:31

## 2017-11-18 RX ADMIN — STANDARDIZED SENNA CONCENTRATE AND DOCUSATE SODIUM SCH TAB: 8.6; 5 TABLET ORAL at 07:32

## 2017-11-18 RX ADMIN — SODIUM CHLORIDE SCH MLS/HR: 900 INJECTION, SOLUTION INTRAVENOUS at 16:43

## 2017-11-18 RX ADMIN — VANCOMYCIN HYDROCHLORIDE SCH MLS/HR: 1 INJECTION, POWDER, LYOPHILIZED, FOR SOLUTION INTRAVENOUS at 12:00

## 2017-11-18 RX ADMIN — INSULIN GLARGINE SCH UNITS: 100 INJECTION, SOLUTION SUBCUTANEOUS at 21:49

## 2017-11-18 RX ADMIN — FINASTERIDE SCH MG: 5 TABLET, FILM COATED ORAL at 07:32

## 2017-11-18 RX ADMIN — INSULIN ASPART SCH UNITS: 100 INJECTION, SOLUTION INTRAVENOUS; SUBCUTANEOUS at 07:00

## 2017-11-18 RX ADMIN — GABAPENTIN SCH MG: 100 CAPSULE ORAL at 07:30

## 2017-11-18 RX ADMIN — AMLODIPINE BESYLATE SCH MG: 5 TABLET ORAL at 07:31

## 2017-11-18 RX ADMIN — ALBUTEROL SULFATE SCH PUFFS: 90 AEROSOL, METERED RESPIRATORY (INHALATION) at 16:43

## 2017-11-18 RX ADMIN — ALBUTEROL SULFATE SCH PUFFS: 90 AEROSOL, METERED RESPIRATORY (INHALATION) at 07:30

## 2017-11-18 RX ADMIN — INSULIN ASPART SCH UNITS: 100 INJECTION, SOLUTION INTRAVENOUS; SUBCUTANEOUS at 11:58

## 2017-11-18 RX ADMIN — MUPIROCIN SCH APPLN: 20 OINTMENT TOPICAL at 07:30

## 2017-11-18 RX ADMIN — INSULIN ASPART SCH UNITS: 100 INJECTION, SOLUTION INTRAVENOUS; SUBCUTANEOUS at 16:42

## 2017-11-18 NOTE — PROGRESS NOTE
Medicine Progress Note


Date & Time of Visit:


Nov 18, 2017 at 16:11.


Subjective


patient seen resting in bed, comfortable


states he had sweats overnight, feels improved today


denies abdominal pain, pain on his urethra


no active bleeding per urethra observed today per RN





denies chest pain, dyspnea, headache, dizziness


no other symptoms





Objective





Last 8 Hrs








  Date Time  Temp Pulse Resp B/P (MAP) Pulse Ox O2 Delivery O2 Flow Rate FiO2


 


11/18/17 16:00     95 Room Air  


 


11/18/17 15:52 36.6 89 20 143/79 (100) 93 Room Air  


 


11/18/17 12:00     95 Room Air  


 


11/18/17 11:22 36.5 90 20 155/86 (109) 91 Room Air  








Physical Exam:


General- oriented x 2, not in distress, speaks in sentences with no effort





Eyes- EOMI, anicteric





ENT- oropharynx clear





Neck- no JVD





Lungs- clear breath sounds bilaterally





Heart- regular rhythm; no murmur, normal rate





Abdomen- normal bowel sounds, soft, nontender


       (+) wade cath in place- no active bleeding per urethra





Extremities- no pretibial edema, no calf tenderness; peripheral pulses intact





Neuro- alert, oriented x 3; no gross focal deficits





Skin- warm & dry


Laboratory Results:





Last 24 Hours








Test


  11/17/17


16:44 11/17/17


20:44 11/17/17


22:39 11/18/17


06:05


 


Bedside Glucose 72 mg/dl  97 mg/dl   


 


Hemoglobin   9.4 g/dL  9.5 g/dL 


 


Hematocrit   29.0 %  30.7 % 


 


White Blood Count    9.95 K/uL 


 


Red Blood Count    3.53 M/uL 


 


Mean Corpuscular Volume    87.0 fL 


 


Mean Corpuscular Hemoglobin    26.9 pg 


 


Mean Corpuscular Hemoglobin


Concent 


  


  


  30.9 g/dl 


 


 


Platelet Count    230 K/uL 


 


Mean Platelet Volume    9.4 fL 


 


Neutrophils (%) (Auto)    69.4 % 


 


Lymphocytes (%) (Auto)    16.4 % 


 


Monocytes (%) (Auto)    10.1 % 


 


Eosinophils (%) (Auto)    3.4 % 


 


Basophils (%) (Auto)    0.2 % 


 


Neutrophils # (Auto)    6.91 K/uL 


 


Lymphocytes # (Auto)    1.63 K/uL 


 


Monocytes # (Auto)    1.00 K/uL 


 


Eosinophils # (Auto)    0.34 K/uL 


 


Basophils # (Auto)    0.02 K/uL 


 


RDW Standard Deviation    45.5 fL 


 


RDW Coefficient of Variation    14.4 % 


 


Immature Granulocyte % (Auto)    0.5 % 


 


Immature Granulocyte # (Auto)    0.05 K/uL 


 


Sodium Level    143 mmol/L 


 


Potassium Level    4.2 mmol/L 


 


Chloride Level    110 mmol/L 


 


Carbon Dioxide Level    25 mmol/L 


 


Anion Gap    8.0 mmol/L 


 


Blood Urea Nitrogen    32 mg/dl 


 


Creatinine    1.67 mg/dl 


 


Est Creatinine Clear Calc


Drug Dose 


  


  


  44.5 ml/min 


 


 


Estimated GFR (


American) 


  


  


  47.0 


 


 


Estimated GFR (Non-


American 


  


  


  40.6 


 


 


BUN/Creatinine Ratio    19.1 


 


Random Glucose    54 mg/dl 


 


Calcium Level    8.4 mg/dl 


 


Iron Level    73 mcg/dl 


 


Total Iron Binding Capacity    162 mcg/dl 


 


Transferrin    135 mg/dl 


 


Transferrin % Saturation    39 % 


 


Ferritin    495.4 ng/ml 


 


Vitamin B12 Level    381 pg/mL 


 


Folate    16.41 ng/mL 


 


Test


  11/18/17


07:10 11/18/17


10:57 11/18/17


12:03 


 


 


Bedside Glucose 88 mg/dl  161 mg/dl   


 


Vancomycin Level Trough   11.3 mcg/ml  











Assessment & Plan


This is a 71yo M who resides at [facility] with a PMH of DM II (uncontrolled)

, HTN, HLD, CKD III and urinary retention (with a wade) who presents with 

dysuria, penile pain and generalized weakness.





Sepsis 2/2 UTI, Bacteremia, Staph


-Initially hypotensive at 93/63, tachycardic at 110 


-BP normotensive, HR improved after IVF resuscitation 


-Leukocytosis of 25, POC Lactate of 1.09





-- remains afebrile


   leukocytosis resolved


   continue IV fluids





-- management of UTI and possible PNA as noted below





UTI, with Bacteremia E coli


Chronic Urinary Retention, Chronic Wade, BPH


-UA with large amount of leuk esterase, nitrite 


-Likely 2/2 urinary retention, wade in place long term 


-- Nasal MRSA:


   Positive


   Urine culture:


   Staph sens pending


   Blood Cultures:


   Staph


-- empiric Vanco + Levaquin Day 3


   Wade cath replaced


   appreciate Dr. Greer' input


   Dr. Alvarez consulted, recommend 2 weeks IV antibiotics


   will need PICC/Midline prior to discharge





Pneumonia ruled out


CHF exacerbation unlikely





Hypoxia, Resolved


- possible Atelectasis?


-- lung sounds clear


   CT chest: no signs of pneumonia


   weaned off oxygen





DEANNE on CKD III: 


-Cr elevated to 2.17 (baseline is 1.2)


-Likely multifactorial: poor PO intake in addition to possible obstruction 2/2 

BPH, urinary retention 


-Denies NSAID use





-- likely from Sepsis, Post Renal- Obstruction


-- crea improving 2.17 --> 1.67


   continue gentle IV fluids, monitor crea





Acute Blood Loss Anemia on Chronic anemia: 


-- Hg down to 7.0


   likely from Hematuria


-- Hemoccult pending


-- s/p  2 units PRBC transfusion


   Hg improved to 9


-- check Anemia panel: iron normal





DM II: 


A1c 11.1


-Hold home regimen 


-Basal bolus regimen while in-patient 


- on Lantus and ISS


   Pharmacy consulted





HTN: 


-Normotensive 


-Cont home amlodipine with hold parameters 





HLD: 


-Continue statin





DVT Ppx: SCDs only in light of hematuria, anemia





Code status: FULL 





PCP: Marci 





Dispo: 


anticipate d/c back to [facility]


will need 2 weeks IV antibiotics, PICC line


Current Inpatient Medications:





Current Inpatient Medications








 Medications


  (Trade)  Dose


 Ordered  Sig/Erika


 Route  Start Time


 Stop Time Status Last Admin


Dose Admin


 


 Acetaminophen


  (Tylenol Tab)  650 mg  Q4H  PRN


 PO  11/16/17 14:00


 12/16/17 13:59  11/17/17 20:09


650 MG


 


 Ondansetron HCl


  (Zofran Inj)  4 mg  Q6H  PRN


 IV  11/16/17 14:00


 12/16/17 13:59   


 


 


 Insulin Aspart


  (novoLOG ASPART)  **SLIDING


 SCALE**


 **If C...  ACHS


 SC  11/16/17 16:00


 12/16/17 15:59  11/18/17 11:58


4 UNITS


 


 Glucose


  (Glucose 40% Gel)  15-30


 GRAMS 15


 GRAMS...  UD  PRN


 PO  11/16/17 14:00


 12/16/17 13:59   


 


 


 Glucose


  (Glucose Chew


 Tab)  4-8


 Tablets 4


 Tabl...  UD  PRN


 PO  11/16/17 14:00


 12/16/17 13:59   


 


 


 Dextrose


  (Dextrose 50%


 50ML Syringe)  25-50ML OF


 50% DW IV


 FOR...  UD  PRN


 IV  11/16/17 14:00


 12/16/17 13:59   


 


 


 Glucagon


  (Glucagon Inj)  1 mg  UD  PRN


 SQ  11/16/17 14:00


 12/16/17 13:59   


 


 


 Amlodipine


 Besylate


  (Norvasc Tab)  5 mg  DAILY


 PO  11/17/17 09:00


 12/17/17 08:59  11/18/17 07:31


5 MG


 


 Atorvastatin


 Calcium


  (Lipitor Tab)  10 mg  DAILY


 PO  11/17/17 09:00


 12/17/17 08:59  11/18/17 07:31


10 MG


 


 Finasteride


  (Proscar Tab)  5 mg  QAM


 PO  11/17/17 09:00


 12/17/17 08:59  11/18/17 07:32


5 MG


 


 Gabapentin


  (Neurontin Cap)  100 mg  DAILY


 PO  11/17/17 09:00


 12/17/17 08:59  11/18/17 07:30


100 MG


 


 Tamsulosin HCl


  (Flomax Cap)  0.4 mg  HS


 PO  11/16/17 21:00


 12/16/17 20:59  11/17/17 20:59


0.4 MG


 


 Levofloxacin


  (Consult)  1 ea  UD  PRN


 N/A  11/16/17 14:45


 12/16/17 14:44   


 


 


 Vancomycin HCl


  (Consult)  1 ea  UD  PRN


 N/A  11/16/17 14:45


 12/16/17 14:44   


 


 


 Vancomycin HCl


 1250 mg/Sodium


 Chloride  275 ml @ 


 200 mls/hr  Q24H


 IV  11/17/17 12:00


 11/24/17 11:59  11/18/17 12:00


200 MLS/HR


 


 Levofloxacin 750


 mg/Prmx  150 ml @ 


 100 mls/hr  Q48H


 IV  11/18/17 14:00


 11/25/17 13:59  11/18/17 14:56


100 MLS/HR


 


 Albuterol


  (Ventolin Hfa


 Inhaler)  2 puffs  QID


 INH  11/16/17 17:00


 12/16/17 16:59  11/18/17 12:31


2 PUFFS


 


 Miscellaneous


 Information


  (Consult


 Glycemic


 Management


 Pharmacy)  1 ea  UD


 N/A  11/16/17 15:42


 12/16/17 15:41   


 


 


 Sodium Chloride  1,000 ml @ 


 100 mls/hr  Q10H


 IV  11/17/17 11:00


 12/17/17 10:59  11/18/17 07:30


100 MLS/HR


 


 Mupirocin


  (Bactroban 2%


 Oint)  1 appln  DAILY


 EXT  11/17/17 11:00


 11/21/17 09:01  11/18/17 07:30


1 APPLN


 


 Senna/Docusate


 Sodium


  (Senokot S Tab)  1 tab  QAM


 PO  11/18/17 09:00


 12/18/17 08:59  11/18/17 07:32


1 TAB


 


 Polyethylene


  (Miralax Powder


 Packet)  17 gm  DAILY  PRN


 PO  11/17/17 11:00


 12/17/17 10:59   


 


 


 Bisacodyl


  (Dulcolax Supp)  10 mg  DAILY  PRN


 NV  11/17/17 11:00


 12/17/17 10:59   


 


 


 Insulin Glargine


  (Lantus Solostar


 Pen)  15 units  HS


 SC  11/18/17 21:00


 12/18/17 20:59

## 2017-11-18 NOTE — PHARMACY PROGRESS NOTE
Pharmacy Abx Dose Progress Nt


Date of Service


Nov 18, 2017.





Pharmacy Dosing Scope


The patient is currently receiving the following antimicrobial agents per 

Pharmacy consult:


Vancomycin 1250 mg IV every 24 hours





Objective


Height (Feet):  6


Height (Inches):  0.00


Weight (Kilograms):  79.500


Vital Signs (Past 12Hrs)





Vital Signs Past 12 Hours








  Date Time  Temp Pulse Resp B/P (MAP) Pulse Ox O2 Delivery O2 Flow Rate FiO2


 


11/18/17 12:00     95 Room Air  


 


11/18/17 11:22 36.5 90 20 155/86 (109) 91 Room Air  


 


11/18/17 08:00     95 Room Air  


 


11/18/17 07:59 37.1 89 18 144/82 (102) 95 Room Air  


 


11/18/17 04:00      Room Air  


 


11/18/17 03:30 36.6 89 24 150/88 (108) 94   








Lab Results (24Hrs)





Laboratory Tests (24 Hours)








Test


  11/18/17


06:05


 


White Blood Count


  9.95 K/uL


(4.8-10.8)


 


Red Blood Count


  3.53 M/uL


(4.7-6.1)  L


 


Hemoglobin


  9.5 g/dL


(14.0-18.0)  L


 


Hematocrit


  30.7 % (42-52)


L


 


Mean Corpuscular Volume


  87.0 fL


()


 


Mean Corpuscular Hemoglobin


  26.9 pg


(25-34)


 


Mean Corpuscular Hemoglobin


Concent 30.9 g/dl


(32-36)  L


 


Platelet Count


  230 K/uL


(130-400)


 


Mean Platelet Volume


  9.4 fL


(7.4-10.4)


 


Neutrophils (%) (Auto) 69.4 %  


 


Lymphocytes (%) (Auto) 16.4 %  


 


Monocytes (%) (Auto) 10.1 %  


 


Eosinophils (%) (Auto) 3.4 %  


 


Basophils (%) (Auto) 0.2 %  


 


Neutrophils # (Auto)


  6.91 K/uL


(1.4-6.5)  H


 


Lymphocytes # (Auto)


  1.63 K/uL


(1.2-3.4)


 


Monocytes # (Auto)


  1.00 K/uL


(0.11-0.59)  H


 


Eosinophils # (Auto)


  0.34 K/uL


(0-0.5)


 


Basophils # (Auto)


  0.02 K/uL


(0-0.2)








Micro Results











 Date/Time


Source Procedure


Growth Status


 


 


 11/16/17 11:46


Blood Blood Culture - Preliminary


Staphylococcus Aureus Resulted


 


 11/16/17 11:41


Blood Blood Culture - Preliminary


Staphylococcus Aureus Resulted


 


 11/16/17 17:00


Nasal MRSA DNA Surveillance Screen - Final


Specimen Positive for MRSA by DNA Probe Complete


 


 11/17/17 11:12


Urine,Catheterized Urine Culture - Preliminary


Staphylococcus Aureus Resulted











Risk Factors for Resistance


* Resident in a nursing home or extended-care facility


* Hospitalization for 48 hours or more within the past 90 days





Assessment & Plan


Assessment








72  year old male receiving Vancomycin for treatment of Bacteremia/Sepsis.


Day # 3/14 of antimicrobial therapy











Plan








Vancomycin IV 


* Trough level of 11.3 mcg/mL is subtherapeutic but was obtained after only 1 

maintenance dose and not at steady state.


* Therefore will continue dose of Vancomycin 1250 mg IV every 24 hours. 


* Goal trough level for Sepsis: 15 to 20 mcg/mL


* Trough Vanco level ordered for: 11/20/17 before dose at 1200.


* Renal function has improved from 2.17 on 11/16 to 1.67 today.


* Blood cultures resulted in Staph aur. and Nasal swab is positive for MRSA.





Pharmacy will continue to follow and will adjust dose/frequency as necessary.  

Thank you.

## 2017-11-18 NOTE — MEDICAL CONSULT
Consultation


Date of Consultation:


2017.


Attending Physician:


Avi Garcia MD


Reason for Consultation:


Staph bacteremia


History of Present Illness


  72-year-old male with history of diabetes mellitus, chronic kidney disease, 

hypertension, with urinary retention requiring indwelling Grady catheter.  

Reportedly catheter was recently changed, and patient subsequently developed 

severe pain associated with the catheter, then fever, chills, and weakness.  

Was brought to the hospital where he was found to have positive blood and urine 

cultures for Staph aureus, sensitivities pending.  Catheter has been 

repositioned and pain has resolved.  Patient currently being treated with IV 

vancomycin, now afebrile.  Denies any flank pain.





Past Medical/Surgical History


Medical Problems:


(1) Anemia


Status: Acute  





(2) Cutaneous abscess of chest wall


Status: Acute  





(3) Pneumonia


Status: Acute  





(4) Routine lab draw


Status: Acute  





(5) Sepsis


Status: Acute  





(6) UTI (urinary tract infection)


Status: Acute  





Medical Problems:


(1) Benign hypertension


(2) BPH (benign prostatic hyperplasia)


(3) CKD (chronic kidney disease), stage III


(4) Diabetes mellitus type 2


(5) GERD (gastroesophageal reflux disease)


(6) Hyperlipidemia


Surgical Problems:


(1) H/O skin graft


(2) S/P tonsillectomy











Family History





Diabetes mellitus


  SISTER


Hypertension


  MOTHER





Social History


Smoking Status:  Former Smoker


Drug Use:  none


Marital Status:  


Housing Status:  lives alone


Occupation Status:  retired





Allergies


Coded Allergies:  


     No Known Allergies (Unverified , 17)





Current Inpatient Medications





Current Inpatient Medications








 Medications


  (Trade)  Dose


 Ordered  Sig/Erika


 Route  Start Time


 Stop Time Status Last Admin


Dose Admin


 


 Acetaminophen


  (Tylenol Tab)  650 mg  Q4H  PRN


 PO  17 14:00


 17 13:59  17 20:09


650 MG


 


 Ondansetron HCl


  (Zofran Inj)  4 mg  Q6H  PRN


 IV  17 14:00


 17 13:59   


 


 


 Insulin Aspart


  (novoLOG ASPART)  **SLIDING


 SCALE**


 **If C...  ACHS


 SC  17 16:00


 17 15:59  17 11:58


4 UNITS


 


 Glucose


  (Glucose 40% Gel)  15-30


 GRAMS 15


 GRAMS...  UD  PRN


 PO  17 14:00


 17 13:59   


 


 


 Glucose


  (Glucose Chew


 Tab)  4-8


 Tablets 4


 Tabl...  UD  PRN


 PO  17 14:00


 17 13:59   


 


 


 Dextrose


  (Dextrose 50%


 50ML Syringe)  25-50ML OF


 50% DW IV


 FOR...  UD  PRN


 IV  17 14:00


 17 13:59   


 


 


 Glucagon


  (Glucagon Inj)  1 mg  UD  PRN


 SQ  17 14:00


 17 13:59   


 


 


 Amlodipine


 Besylate


  (Norvasc Tab)  5 mg  DAILY


 PO  17 09:00


 17 08:59  17 07:31


5 MG


 


 Atorvastatin


 Calcium


  (Lipitor Tab)  10 mg  DAILY


 PO  17 09:00


 17 08:59  17 07:31


10 MG


 


 Finasteride


  (Proscar Tab)  5 mg  QAM


 PO  17 09:00


 17 08:59  17 07:32


5 MG


 


 Gabapentin


  (Neurontin Cap)  100 mg  DAILY


 PO  17 09:00


 17 08:59  17 07:30


100 MG


 


 Tamsulosin HCl


  (Flomax Cap)  0.4 mg  HS


 PO  17 21:00


 17 20:59  17 20:59


0.4 MG


 


 Levofloxacin


  (Consult)  1 ea  UD  PRN


 N/A  17 14:45


 17 14:44   


 


 


 Vancomycin HCl


  (Consult)  1 ea  UD  PRN


 N/A  17 14:45


 17 14:44   


 


 


 Vancomycin HCl


 1250 mg/Sodium


 Chloride  275 ml @ 


 200 mls/hr  Q24H


 IV  17 12:00


 17 11:59  17 12:00


200 MLS/HR


 


 Levofloxacin 750


 mg/Prmx  150 ml @ 


 100 mls/hr  Q48H


 IV  17 14:00


 17 13:59   


 


 


 Albuterol


  (Ventolin Hfa


 Inhaler)  2 puffs  QID


 INH  17 17:00


 17 16:59  17 12:31


2 PUFFS


 


 Miscellaneous


 Information


  (Consult


 Glycemic


 Management


 Pharmacy)  1 ea  UD


 N/A  17 15:42


 17 15:41   


 


 


 Sodium Chloride  1,000 ml @ 


 100 mls/hr  Q10H


 IV  17 11:00


 17 10:59  17 07:30


100 MLS/HR


 


 Mupirocin


  (Bactroban 2%


 Oint)  1 appln  DAILY


 EXT  17 11:00


 17 09:01  17 07:30


1 APPLN


 


 Senna/Docusate


 Sodium


  (Senokot S Tab)  1 tab  QAM


 PO  17 09:00


 17 08:59  17 07:32


1 TAB


 


 Polyethylene


  (Miralax Powder


 Packet)  17 gm  DAILY  PRN


 PO  17 11:00


 17 10:59   


 


 


 Bisacodyl


  (Dulcolax Supp)  10 mg  DAILY  PRN


 UT  17 11:00


 17 10:59   


 


 


 Insulin Glargine


  (Lantus Solostar


 Pen)  15 units  HS


 SC  17 21:00


 17 20:59   


 











Review of Systems


Constitutional:  + weakness, No fever


Eyes:  No problem reported


ENT:  No problem reported


Respiratory:  No problem reported


Cardiovascular:  No problem reported


Abdomen:  No problem reported


Musculoskeletal:  No problem reported


Genitourinary - Male:  + problem reported (See HPI)


Neurologic:  + weakness, + balance problems


Psychiatric:  No problem reported


Endocrine:  No problem reported


Hematologic / Lymphatic:  No problem reported


Integumentary:  No problem reported


Allergic / Immunologic:  No problem reported





Physical Exam











  Date Time  Temp Pulse Resp B/P (MAP) Pulse Ox O2 Delivery O2 Flow Rate FiO2


 


17 12:00     95 Room Air  


 


17 11:22 36.5 90 20 155/86 (109) 91 Room Air  


 


17 08:00     95 Room Air  


 


17 07:59 37.1 89 18 144/82 (102) 95 Room Air  


 


17 04:00      Room Air  


 


17 03:30 36.6 89 24 150/88 (108) 94   


 


17 00:00 37.4  16 126/73 (90) 94 Room Air  


 


17 00:00     94 Room Air  


 


17 20:55 36.9 88  133/74 94   


 


17 20:00      Room Air  


 


17 19:42 37.0 89 22 131/74 (93) 93 Room Air  


 


17 19:28 37.3   113/60    


 


17 18:56  93  123/66    


 


17 18:34     92 Room Air  


 


17 18:30 37.5 94  110/62    


 


17 18:15 37.5 92  107/63    


 


17 17:52 37.3 94  112/60 92   


 


17 16:32 37.1 95 20 127/70 93   


 


17 15:57 36.5 94 20 117/65 (82) 92 Room Air  


 


17 15:30 37.6 92  116/66 93   


 


17 15:00 37.4 94  131/73    


 


17 14:21 36.7 94 20 119/67 93   


 


17 14:07 37.1 96 20 104/57 91   








General Appearance:  WD/WN, no apparent distress


Head:  normocephalic, atraumatic


Eyes:  sclerae normal, + pertinent finding (Right eye blindness)


ENT:  normal ENT inspection, hearing grossly normal, pharynx normal


Neck:  supple, no adenopathy, thyroid normal, trachea midline


Respiratory/Chest:  chest non-tender, lungs clear, normal breath sounds, no 

respiratory distress


Cardiovascular:  regular rate, rhythm, no gallop, no murmur


Abdomen/GI:  normal bowel sounds, non tender, soft, no organomegaly


Genitourinary - Male:  + pertinent finding (Grady draining clear urine)


Back:  normal inspection, no CVA tenderness


Extremities/Musculoskelatal:  no calf tenderness, non-tender


Neurologic/Psych:  alert, oriented x 3


Skin:  normal color, warm/dry, no rash


Lymphatic:  no adenopathy





Laboratory Results





Results Past 24 Hours








Test


  17


16:44 17


20:44 17


22:39 17


06:05 Range/Units


 


 


Bedside Glucose 72 97   70-99  mg/dl


 


Hemoglobin   9.4 9.5 14.0-18.0  g/dL


 


Hematocrit   29.0 30.7 42-52  %


 


White Blood Count    9.95 4.8-10.8  K/uL


 


Red Blood Count    3.53 4.7-6.1  M/uL


 


Mean Corpuscular Volume    87.0   fL


 


Mean Corpuscular Hemoglobin    26.9 25-34  pg


 


Mean Corpuscular Hemoglobin


Concent 


  


  


  30.9


  32-36  g/dl


 


 


Platelet Count    230 130-400  K/uL


 


Mean Platelet Volume    9.4 7.4-10.4  fL


 


Neutrophils (%) (Auto)    69.4  %


 


Lymphocytes (%) (Auto)    16.4  %


 


Monocytes (%) (Auto)    10.1  %


 


Eosinophils (%) (Auto)    3.4  %


 


Basophils (%) (Auto)    0.2  %


 


Neutrophils # (Auto)    6.91 1.4-6.5  K/uL


 


Lymphocytes # (Auto)    1.63 1.2-3.4  K/uL


 


Monocytes # (Auto)    1.00 0.11-0.59  K/uL


 


Eosinophils # (Auto)    0.34 0-0.5  K/uL


 


Basophils # (Auto)    0.02 0-0.2  K/uL


 


RDW Standard Deviation    45.5 36.4-46.3  fL


 


RDW Coefficient of Variation    14.4 11.5-14.5  %


 


Immature Granulocyte % (Auto)    0.5  %


 


Immature Granulocyte # (Auto)    0.05 0.00-0.02  K/uL


 


Sodium Level    143 136-145  mmol/L


 


Potassium Level    4.2 3.5-5.1  mmol/L


 


Chloride Level    110   mmol/L


 


Carbon Dioxide Level    25 21-32  mmol/L


 


Anion Gap    8.0 3-11  mmol/L


 


Blood Urea Nitrogen    32 7-18  mg/dl


 


Creatinine


  


  


  


  1.67


  0.60-1.40


mg/dl


 


Est Creatinine Clear Calc


Drug Dose 


  


  


  44.5


   ml/min


 


 


Estimated GFR (


American) 


  


  


  47.0


   


 


 


Estimated GFR (Non-


American 


  


  


  40.6


   


 


 


BUN/Creatinine Ratio    19.1 10-20  


 


Random Glucose    54 70-99  mg/dl


 


Calcium Level    8.4 8.5-10.1  mg/dl


 


Iron Level    73   mcg/dl


 


Total Iron Binding Capacity    162 250-450  mcg/dl


 


Transferrin    135 200-360  mg/dl


 


Transferrin % Saturation    39 20-50  %


 


Ferritin


  


  


  


  495.4


  8.0-388.0


ng/ml


 


Vitamin B12 Level    381 211-911  pg/mL


 


Folate    16.41 >5.38  ng/mL


 


Test


  17


07:10 17


10:57 17


12:03 


  Range/Units


 


 


Bedside Glucose 88 161   70-99  mg/dl


 


Vancomycin Level Trough


  


  


  11.3


  


  SEE COMMENT


mcg/ml








--------------------------------------------------------------------------------

------------





RUN DATE: 17                [facility]             

    PAGE 1   


RUN TIME: 1302                            Specimen Inquiry                    


--------------------------------------------------------------------------------

------------





PATIENT: DORIS MCKNIGHT               ACCT #: [id number] LOC:  GLENNA       U #

: [id number]


                                       AGE/SX: 72/M         ROOM: [facility]       REG

: 17


REG DR:  Avi Garcia MD        :    1945   BED:  1          DIS

:         


                                       STATUS: ADM IN       TLOC:           


--------------------------------------------------------------------------------

------------








SPEC #: [id number]        AUGIE:      STATUS:  RES            REQ 

#: [id number]


                            RECD:      SUBM DR: Dony Rodriguez MD        


SOURCE: BLOOD               ENTR:      USHA DR: Jaky Doctor, 

Assigned           


[facility]:                                                                        

            


ORDERED:  BLOOD CULTURE                                                        

   


COMMENTS: Comments to Phlebotomist SAME TIME, DIFFERENT SITES         


--------------------------------------------------------------------------------

------------





  Procedure                         Result                         Verified    

       Site


--------------------------------------------------------------------------------

------------





  BLD CULT  Preliminary                                            


     Organism 1                     STAPHYLOCOCCUS AUREUS


        SENS                        NO SENSITIVITY TO FOLLOW





        Phoned Positive Blood Culture Gram Stain Report to ORA PUENTES on 17 At 0116 By TRAN.SL. Results were


        verbalized back to [email or web address].


        PLEASE SEE CULTURE NUMBER [id number] FOR SENSITIVITIES.


        





--------------------------------------------------------------------------------

------------

















                                                                               

                                                                 


Patient Name: DORIS MCKNIGHT                               Unit Number:  

[id number]                  


 





 











Dictated: 17


 


Transcribed: 17


 


MS


 


Printed Date/Time: [~ rep prt dt]/[~ rep prt tm]








 [~ rep ct labl] - [~ rep ct ivnm]


 





   [facility]


 Radiology Department


 [place] [address] [phone]





 











Dictated: 17


 


Transcribed: 17 1254


 


MS


 


Printed Date/Time: [~ rep prt dt]/[~ rep prt tm]








 [~ rep ct labl] - [~ rep ct ivnm]


 








[~ rep ct add3]]


(CHEST) THORAX WITHOUT





CT DOSE: 434.51 mGy.cm





HISTORY: Dyspnea. Infection.  r/o pneumonia





TECHNIQUE: Multiaxial CT images of the chest were performed without contrast.  A


dose lowering technique was utilized adhering to the principles of ALARA.








COMPARISON: None.





FINDINGS: Mild bibasilar atelectasis. Slight basilar interstitial prominence. No


well-defined focal infiltrate.





Moderate abscess chronic change and ectasia thoracic aorta. No evidence for


aneurysm.





IMPRESSION: 





1. Mild chronic basilar interstitial and atelectatic change.


2. No focal infiltrate














The above report was generated using voice recognition software.  It may contain


grammatical, syntax or spelling errors.











Electronically signed by:  John Mane M.D.


2017 12:59 PM





Dictated Date/Time:  2017 12:54 PM





 The status of this report is Signed.   


 Draft = Not yet reviewed or approved by Radiologist.  


 Signed = Reviewed and approved by Radiologist.


<AttendingPhy>Avi Garcia MD</AttendingPhy> <FamilyPhy>No Doctor, 

Assigned</FamilyPhy> <PrimaryPhy>Stephanie Covarrubias D.O.</PrimaryPhy> <UnitNumber>

[id number]</UnitNumber> <VisitNumber>[id number]</VisitNumber> <PatientName>

DORIS MCKNIGHT</PatientName> <DateOfBirth>1945</DateOfBirth> <Location>

C.2E</Location> <ServiceDate>17</ServiceDate> <MNE>ESINDI</MNE> <

OrderingPhy>Avi Garcia MD</OrderingPhy> <OrderingPhyMNE>f rep ord dr richmond<

/OrderingPhyMNE> <DictatingPhyMNE>f rep dict dr richmond</DictatingPhyMNE> <CCListMNE

>f rep ct mne</CCListMNE> <AdmittingPhyMNE>f pt admit dr richmond</AdmittingPhyMNE> <

AttendingPhyMNE>f pt attend dr richmond</AttendingPhyMNE>


<ConsultingPhyMNE>f pt consult dr richmond</ConsultingPhyMNE> <FamilyPhyMNE>f pt fam 

dr richmond</FamilyPhyMNE> <OtherPhyMNE>f pt other dr richmond</OtherPhyMNE> <

PrimaryPhyMNE>f pt prim care dr richmond</PrimaryPhyMNE> <ReferringPhyMNE>f pt 

referring dr richmond</ReferringPhyMNE>









































Last 24 Hours








Test


  17


16:44 17


20:44 17


22:39 17


06:05


 


Bedside Glucose 72 mg/dl  97 mg/dl   


 


Hemoglobin   9.4 g/dL  9.5 g/dL 


 


Hematocrit   29.0 %  30.7 % 


 


White Blood Count    9.95 K/uL 


 


Red Blood Count    3.53 M/uL 


 


Mean Corpuscular Volume    87.0 fL 


 


Mean Corpuscular Hemoglobin    26.9 pg 


 


Mean Corpuscular Hemoglobin


Concent 


  


  


  30.9 g/dl 


 


 


Platelet Count    230 K/uL 


 


Mean Platelet Volume    9.4 fL 


 


Neutrophils (%) (Auto)    69.4 % 


 


Lymphocytes (%) (Auto)    16.4 % 


 


Monocytes (%) (Auto)    10.1 % 


 


Eosinophils (%) (Auto)    3.4 % 


 


Basophils (%) (Auto)    0.2 % 


 


Neutrophils # (Auto)    6.91 K/uL 


 


Lymphocytes # (Auto)    1.63 K/uL 


 


Monocytes # (Auto)    1.00 K/uL 


 


Eosinophils # (Auto)    0.34 K/uL 


 


Basophils # (Auto)    0.02 K/uL 


 


RDW Standard Deviation    45.5 fL 


 


RDW Coefficient of Variation    14.4 % 


 


Immature Granulocyte % (Auto)    0.5 % 


 


Immature Granulocyte # (Auto)    0.05 K/uL 


 


Sodium Level    143 mmol/L 


 


Potassium Level    4.2 mmol/L 


 


Chloride Level    110 mmol/L 


 


Carbon Dioxide Level    25 mmol/L 


 


Anion Gap    8.0 mmol/L 


 


Blood Urea Nitrogen    32 mg/dl 


 


Creatinine    1.67 mg/dl 


 


Est Creatinine Clear Calc


Drug Dose 


  


  


  44.5 ml/min 


 


 


Estimated GFR (


American) 


  


  


  47.0 


 


 


Estimated GFR (Non-


American 


  


  


  40.6 


 


 


BUN/Creatinine Ratio    19.1 


 


Random Glucose    54 mg/dl 


 


Calcium Level    8.4 mg/dl 


 


Iron Level    73 mcg/dl 


 


Total Iron Binding Capacity    162 mcg/dl 


 


Transferrin    135 mg/dl 


 


Transferrin % Saturation    39 % 


 


Ferritin    495.4 ng/ml 


 


Vitamin B12 Level    381 pg/mL 


 


Folate    16.41 ng/mL 


 


Test


  17


07:10 17


10:57 17


12:03 


 


 


Bedside Glucose 88 mg/dl  161 mg/dl   


 


Vancomycin Level Trough   11.3 mcg/ml  











Assessment & Plan


Staph aureus sepsis, likely MRSA given positive MRSA screen, from improperly 

placed catheter with subsequent purulent urinary tract infection.  Patient will 

require 2 weeks of IV therapy given positive blood cultures, and would continue 

vancomycin pending final identification and sensitivities.  Will follow.

## 2017-11-18 NOTE — PHARMACY PROGRESS NOTE
Pharmacy Glycemic Short Note 2


Date of Service


Nov 18, 2017.





ASSESSMENT:


* Pt has received 44 units of insulin over the past 24 hrs


* AM fasting BSG is slighlty below goal range @ 88 mg/dl --> will reduce basal 

insulin dosing


* Post-prandial BSGs are in range --> no changes needed to CF/CR


* Scr improving, infection is being adequately treated, pt tolerating PO. May 

need to increase insulin doses back up towards outpatient dosing with these 

improvements in patient status. Will reassess and continue to titrate insulin 

based on BSG trends. 











PLAN FOR INPATIENT GLYCEMIC CONTROL:





* Basal insulin: decrease dose


 * Lantus 15 units SQ HS





* Bolus insulin: no change


 * NovoLog per scale ACHS or Q6hrs while NPO


 * Goal Range:  Low 110 mg/dL - High 140 mg/dL


 * Correction Factor:  25 mg/dL/unit


 * Nutritional / Prandial insulin per carb ratio of  1 unit per 8 grams CHO 

consumed

## 2017-11-19 VITALS
HEART RATE: 87 BPM | TEMPERATURE: 97.7 F | OXYGEN SATURATION: 97 % | SYSTOLIC BLOOD PRESSURE: 145 MMHG | DIASTOLIC BLOOD PRESSURE: 83 MMHG

## 2017-11-19 VITALS
HEART RATE: 98 BPM | SYSTOLIC BLOOD PRESSURE: 151 MMHG | DIASTOLIC BLOOD PRESSURE: 82 MMHG | OXYGEN SATURATION: 90 % | TEMPERATURE: 97.7 F

## 2017-11-19 VITALS
SYSTOLIC BLOOD PRESSURE: 147 MMHG | HEART RATE: 78 BPM | TEMPERATURE: 98.24 F | OXYGEN SATURATION: 98 % | DIASTOLIC BLOOD PRESSURE: 80 MMHG

## 2017-11-19 VITALS
OXYGEN SATURATION: 94 % | DIASTOLIC BLOOD PRESSURE: 65 MMHG | SYSTOLIC BLOOD PRESSURE: 117 MMHG | HEART RATE: 88 BPM | TEMPERATURE: 97.7 F

## 2017-11-19 LAB
ANION GAP SERPL CALC-SCNC: 5 MMOL/L (ref 3–11)
BASOPHILS # BLD: 0.02 K/UL (ref 0–0.2)
BASOPHILS NFR BLD: 0.3 %
BUN SERPL-MCNC: 25 MG/DL (ref 7–18)
BUN/CREAT SERPL: 16 (ref 10–20)
CALCIUM SERPL-MCNC: 8.5 MG/DL (ref 8.5–10.1)
CHLORIDE SERPL-SCNC: 109 MMOL/L (ref 98–107)
CO2 SERPL-SCNC: 26 MMOL/L (ref 21–32)
COMPLETE: YES
CREAT CL PREDICTED SERPL C-G-VRATE: 46.8 ML/MIN
CREAT SERPL-MCNC: 1.59 MG/DL (ref 0.6–1.4)
EOSINOPHIL NFR BLD AUTO: 249 K/UL (ref 130–400)
GLUCOSE SERPL-MCNC: 184 MG/DL (ref 70–99)
HCT VFR BLD CALC: 32.1 % (ref 42–52)
IG%: 0.4 %
IMM GRANULOCYTES NFR BLD AUTO: 17.4 %
LYMPHOCYTES # BLD: 1.34 K/UL (ref 1.2–3.4)
MCH RBC QN AUTO: 27.2 PG (ref 25–34)
MCHC RBC AUTO-ENTMCNC: 31.5 G/DL (ref 32–36)
MCV RBC AUTO: 86.5 FL (ref 80–100)
MONOCYTES NFR BLD: 12.3 %
NEUTROPHILS # BLD AUTO: 4.5 %
NEUTROPHILS NFR BLD AUTO: 65.1 %
PMV BLD AUTO: 9.2 FL (ref 7.4–10.4)
POTASSIUM SERPL-SCNC: 4.5 MMOL/L (ref 3.5–5.1)
RBC # BLD AUTO: 3.71 M/UL (ref 4.7–6.1)
SODIUM SERPL-SCNC: 140 MMOL/L (ref 136–145)
WBC # BLD AUTO: 7.72 K/UL (ref 4.8–10.8)

## 2017-11-19 RX ADMIN — ATORVASTATIN CALCIUM SCH MG: 10 TABLET, FILM COATED ORAL at 08:10

## 2017-11-19 RX ADMIN — VANCOMYCIN HYDROCHLORIDE SCH MLS/HR: 1 INJECTION, POWDER, LYOPHILIZED, FOR SOLUTION INTRAVENOUS at 12:28

## 2017-11-19 RX ADMIN — INSULIN ASPART SCH UNITS: 100 INJECTION, SOLUTION INTRAVENOUS; SUBCUTANEOUS at 12:51

## 2017-11-19 RX ADMIN — INSULIN ASPART SCH UNITS: 100 INJECTION, SOLUTION INTRAVENOUS; SUBCUTANEOUS at 18:35

## 2017-11-19 RX ADMIN — ALBUTEROL SULFATE SCH PUFFS: 90 AEROSOL, METERED RESPIRATORY (INHALATION) at 12:29

## 2017-11-19 RX ADMIN — INSULIN ASPART SCH UNITS: 100 INJECTION, SOLUTION INTRAVENOUS; SUBCUTANEOUS at 08:47

## 2017-11-19 RX ADMIN — TAMSULOSIN HYDROCHLORIDE SCH MG: 0.4 CAPSULE ORAL at 19:37

## 2017-11-19 RX ADMIN — GABAPENTIN SCH MG: 100 CAPSULE ORAL at 08:10

## 2017-11-19 RX ADMIN — ALBUTEROL SULFATE SCH PUFFS: 90 AEROSOL, METERED RESPIRATORY (INHALATION) at 08:11

## 2017-11-19 RX ADMIN — ALBUTEROL SULFATE SCH PUFFS: 90 AEROSOL, METERED RESPIRATORY (INHALATION) at 17:45

## 2017-11-19 RX ADMIN — INSULIN GLARGINE SCH UNITS: 100 INJECTION, SOLUTION SUBCUTANEOUS at 20:22

## 2017-11-19 RX ADMIN — SODIUM CHLORIDE SCH MLS/HR: 900 INJECTION, SOLUTION INTRAVENOUS at 06:38

## 2017-11-19 RX ADMIN — SODIUM CHLORIDE SCH MLS/HR: 900 INJECTION, SOLUTION INTRAVENOUS at 19:37

## 2017-11-19 RX ADMIN — ALBUTEROL SULFATE SCH PUFFS: 90 AEROSOL, METERED RESPIRATORY (INHALATION) at 19:36

## 2017-11-19 RX ADMIN — AMLODIPINE BESYLATE SCH MG: 5 TABLET ORAL at 08:10

## 2017-11-19 RX ADMIN — STANDARDIZED SENNA CONCENTRATE AND DOCUSATE SODIUM SCH TAB: 8.6; 5 TABLET ORAL at 08:10

## 2017-11-19 RX ADMIN — MUPIROCIN SCH APPLN: 20 OINTMENT TOPICAL at 08:11

## 2017-11-19 RX ADMIN — FINASTERIDE SCH MG: 5 TABLET, FILM COATED ORAL at 08:10

## 2017-11-19 RX ADMIN — INSULIN ASPART SCH UNITS: 100 INJECTION, SOLUTION INTRAVENOUS; SUBCUTANEOUS at 20:21

## 2017-11-19 NOTE — PROGRESS NOTE
Medicine Progress Note


Date & Time of Visit:


Nov 19, 2017 at 10:58.


Subjective


seen resting in bed, comfortable


states he continues to feel improved


denies fever/chills


no hematuria


denies other symptoms





Objective





Last 8 Hrs








  Date Time  Temp Pulse Resp B/P (MAP) Pulse Ox O2 Delivery O2 Flow Rate FiO2


 


11/19/17 08:00      Room Air  


 


11/19/17 07:06 36.8 78 18 147/80 (102) 98 Room Air  








Physical Exam:


General- oriented x 2, not in distress, speaks in sentences with no effort





Eyes- anicteric





Neck- no JVD





Lungs- clear breath sounds bilaterally, no rales/wheezes





Heart- regular rhythm; no murmur, normal rate





Abdomen- normal bowel sounds, soft, nontender


       (+) wade cath in place- no active bleeding per urethra





Extremities- no pretibial edema, no calf tenderness





Neuro- alert, oriented x 3; no gross focal deficits





Skin- warm & dry


Laboratory Results:





Last 24 Hours








Test


  11/18/17


12:03 11/18/17


16:23 11/18/17


20:21 11/19/17


06:20


 


Vancomycin Level Trough 11.3 mcg/ml    


 


Bedside Glucose  138 mg/dl  109 mg/dl  


 


White Blood Count    7.72 K/uL 


 


Red Blood Count    3.71 M/uL 


 


Hemoglobin    10.1 g/dL 


 


Hematocrit    32.1 % 


 


Mean Corpuscular Volume    86.5 fL 


 


Mean Corpuscular Hemoglobin    27.2 pg 


 


Mean Corpuscular Hemoglobin


Concent 


  


  


  31.5 g/dl 


 


 


Platelet Count    249 K/uL 


 


Mean Platelet Volume    9.2 fL 


 


Neutrophils (%) (Auto)    65.1 % 


 


Lymphocytes (%) (Auto)    17.4 % 


 


Monocytes (%) (Auto)    12.3 % 


 


Eosinophils (%) (Auto)    4.5 % 


 


Basophils (%) (Auto)    0.3 % 


 


Neutrophils # (Auto)    5.03 K/uL 


 


Lymphocytes # (Auto)    1.34 K/uL 


 


Monocytes # (Auto)    0.95 K/uL 


 


Eosinophils # (Auto)    0.35 K/uL 


 


Basophils # (Auto)    0.02 K/uL 


 


RDW Standard Deviation    44.9 fL 


 


RDW Coefficient of Variation    14.2 % 


 


Immature Granulocyte % (Auto)    0.4 % 


 


Immature Granulocyte # (Auto)    0.03 K/uL 


 


Sodium Level    140 mmol/L 


 


Potassium Level    4.5 mmol/L 


 


Chloride Level    109 mmol/L 


 


Carbon Dioxide Level    26 mmol/L 


 


Anion Gap    5.0 mmol/L 


 


Blood Urea Nitrogen    25 mg/dl 


 


Creatinine    1.59 mg/dl 


 


Est Creatinine Clear Calc


Drug Dose 


  


  


  46.8 ml/min 


 


 


Estimated GFR (


American) 


  


  


  49.9 


 


 


Estimated GFR (Non-


American 


  


  


  43.0 


 


 


BUN/Creatinine Ratio    16.0 


 


Random Glucose    184 mg/dl 


 


Calcium Level    8.5 mg/dl 


 


Test


  11/19/17


07:33 


  


  


 


 


Bedside Glucose 145 mg/dl    











Assessment & Plan


This is a 73yo M who resides at [facility] with a PMH of DM II (uncontrolled)

, HTN, HLD, CKD III and urinary retention (with a wade) who presents with 

dysuria, penile pain and generalized weakness.





Sepsis 2/2 UTI, Bacteremia, Staph


-Initially hypotensive at 93/63, tachycardic at 110 


-BP normotensive, HR improved after IVF resuscitation 


-Leukocytosis of 25, POC Lactate of 1.09





-- remains afebrile


   leukocytosis resolved


   continue IV fluids





-- management of UTI and possible PNA as noted below





UTI, with Bacteremia E coli


Chronic Urinary Retention, Chronic Wade, BPH


-UA with large amount of leuk esterase, nitrite 


-Likely 2/2 urinary retention, wade in place long term 


-- Nasal MRSA:


   Positive


   Urine culture:


   Staph MRSA


   Blood Cultures:


   Staph MRSA


-- empiric Vanco Day 4, d/c Levaquin


   Wade cath replaced


   appreciate Dr. Greer' input


   Dr. Alvarez consulted, recommend 2 weeks IV antibiotics


   will need PICC/Midline prior to discharge





Pneumonia ruled out


CHF exacerbation unlikely





Hypoxia, Resolved


- possible Atelectasis?


-- lung sounds clear


   CT chest: no signs of pneumonia


   weaned off oxygen





DEANNE on CKD III: 


-Cr elevated to 2.17 (baseline is 1.2)


-Likely multifactorial: poor PO intake in addition to possible obstruction 2/2 

BPH, urinary retention 


-Denies NSAID use





-- likely from Sepsis, Post Renal- Obstruction


-- crea improving 2.17 --> 1.67--> 1.59


   continue gentle IV fluids, monitor crea





Acute Blood Loss Anemia on Chronic anemia: 


-- Hg down to 7.0


   likely from Hematuria


-- Hemoccult pending


-- s/p  2 units PRBC transfusion


   Hg improved to 9


-- check Anemia panel: iron normal





DM II: 


A1c 11.1


-Hold home regimen 


-Basal bolus regimen while in-patient 


- on Lantus and ISS


   Pharmacy consulted





HTN: 


-Normotensive 


-Cont home amlodipine with hold parameters 





HLD: 


-Continue statin





DVT Ppx: SCDs only in light of hematuria, anemia





Code status: FULL 





PCP: Marci 





Dispo: 


anticipate d/c back to [facility]


will need 2 weeks IV antibiotics, PICC line


Current Inpatient Medications:





Current Inpatient Medications








 Medications


  (Trade)  Dose


 Ordered  Sig/Erika


 Route  Start Time


 Stop Time Status Last Admin


Dose Admin


 


 Acetaminophen


  (Tylenol Tab)  650 mg  Q4H  PRN


 PO  11/16/17 14:00


 12/16/17 13:59  11/17/17 20:09


650 MG


 


 Ondansetron HCl


  (Zofran Inj)  4 mg  Q6H  PRN


 IV  11/16/17 14:00


 12/16/17 13:59   


 


 


 Insulin Aspart


  (novoLOG ASPART)  **SLIDING


 SCALE**


 **If C...  ACHS


 SC  11/16/17 16:00


 12/16/17 15:59  11/19/17 08:47


9 UNITS


 


 Glucose


  (Glucose 40% Gel)  15-30


 GRAMS 15


 GRAMS...  UD  PRN


 PO  11/16/17 14:00


 12/16/17 13:59   


 


 


 Glucose


  (Glucose Chew


 Tab)  4-8


 Tablets 4


 Tabl...  UD  PRN


 PO  11/16/17 14:00


 12/16/17 13:59   


 


 


 Dextrose


  (Dextrose 50%


 50ML Syringe)  25-50ML OF


 50% DW IV


 FOR...  UD  PRN


 IV  11/16/17 14:00


 12/16/17 13:59   


 


 


 Glucagon


  (Glucagon Inj)  1 mg  UD  PRN


 SQ  11/16/17 14:00


 12/16/17 13:59   


 


 


 Amlodipine


 Besylate


  (Norvasc Tab)  5 mg  DAILY


 PO  11/17/17 09:00


 12/17/17 08:59  11/19/17 08:10


5 MG


 


 Atorvastatin


 Calcium


  (Lipitor Tab)  10 mg  DAILY


 PO  11/17/17 09:00


 12/17/17 08:59  11/19/17 08:10


10 MG


 


 Finasteride


  (Proscar Tab)  5 mg  QAM


 PO  11/17/17 09:00


 12/17/17 08:59  11/19/17 08:10


5 MG


 


 Gabapentin


  (Neurontin Cap)  100 mg  DAILY


 PO  11/17/17 09:00


 12/17/17 08:59  11/19/17 08:10


100 MG


 


 Tamsulosin HCl


  (Flomax Cap)  0.4 mg  HS


 PO  11/16/17 21:00


 12/16/17 20:59  11/18/17 20:04


0.4 MG


 


 Levofloxacin


  (Consult)  1 ea  UD  PRN


 N/A  11/16/17 14:45


 12/16/17 14:44   


 


 


 Vancomycin HCl


  (Consult)  1 ea  UD  PRN


 N/A  11/16/17 14:45


 12/16/17 14:44   


 


 


 Vancomycin HCl


 1250 mg/Sodium


 Chloride  275 ml @ 


 200 mls/hr  Q24H


 IV  11/17/17 12:00


 11/24/17 11:59  11/18/17 12:00


200 MLS/HR


 


 Levofloxacin 750


 mg/Prmx  150 ml @ 


 100 mls/hr  Q48H


 IV  11/18/17 14:00


 11/25/17 13:59  11/18/17 14:56


100 MLS/HR


 


 Albuterol


  (Ventolin Hfa


 Inhaler)  2 puffs  QID


 INH  11/16/17 17:00


 12/16/17 16:59  11/19/17 08:11


2 PUFFS


 


 Miscellaneous


 Information


  (Consult


 Glycemic


 Management


 Pharmacy)  1   UD


 N/A  11/16/17 15:42


 12/16/17 15:41   


 


 


 Sodium Chloride  1,000 ml @ 


 75 mls/hr  [id number]


 IV  11/17/17 11:00


 12/17/17 10:59  11/19/17 06:38


75 MLS/HR


 


 Mupirocin


  (Bactroban 2%


 Oint)  1 appln  DAILY


 EXT  11/17/17 11:00


 11/21/17 09:01  11/19/17 08:11


1 APPLN


 


 Senna/Docusate


 Sodium


  (Senokot S Tab)  1 tab  QAM


 PO  11/18/17 09:00


 12/18/17 08:59  11/19/17 08:10


1 TAB


 


 Polyethylene


  (Miralax Powder


 Packet)  17 gm  DAILY  PRN


 PO  11/17/17 11:00


 12/17/17 10:59   


 


 


 Bisacodyl


  (Dulcolax Supp)  10 mg  DAILY  PRN


 MI  11/17/17 11:00


 12/17/17 10:59   


 


 


 Insulin Glargine


  (Lantus Solostar


 Pen)  15 units  HS


 SC  11/18/17 21:00


 12/18/17 20:59  11/18/17 21:49


15 UNITS

## 2017-11-19 NOTE — PHARMACY PROGRESS NOTE
Glycemic: Assessment & Plan


Date of Service


Nov 19, 2017.





Assessment & Plan


The patient is currently receiving ~30 units of insulin per day.  BSGs ranging 

109 - 161 mg/dl over the past 24hrs. 





* Basal insulin:             Lantus 15 units every 24 hours given at bedtime





* Correctional Insulin:   Novolog Correction per scale ACHS


                         Goal Range: Low 110 mg/dL - High 140 mg/dL


                         Correction Factor: 25 mg/dL/unit





* Prandial insulin:         Per carb ratio of 1 unit per 8 grams CHO consumed








BSGs continue to improve, no changes needed to inpatient regimen at this time. 

Pharmacy will continue to monitor patient daily and write orders per [facility] 

inpatient glycemic control protocol. Thanks.








* Please note that the plan above was derived based on current level of insulin 

resistance and hospital stress. These recommendations are appropriate for 

inpatient admission only. Plan of care upon discharge will need to be 

reassessed to avoid potential outpatient hypo/hyperglycemia.

## 2017-11-20 VITALS
HEART RATE: 81 BPM | DIASTOLIC BLOOD PRESSURE: 76 MMHG | SYSTOLIC BLOOD PRESSURE: 129 MMHG | TEMPERATURE: 98.06 F | OXYGEN SATURATION: 98 %

## 2017-11-20 VITALS
TEMPERATURE: 98.42 F | OXYGEN SATURATION: 94 % | SYSTOLIC BLOOD PRESSURE: 149 MMHG | HEART RATE: 88 BPM | DIASTOLIC BLOOD PRESSURE: 79 MMHG

## 2017-11-20 VITALS
TEMPERATURE: 98.06 F | HEART RATE: 81 BPM | OXYGEN SATURATION: 98 % | SYSTOLIC BLOOD PRESSURE: 129 MMHG | DIASTOLIC BLOOD PRESSURE: 76 MMHG

## 2017-11-20 VITALS
TEMPERATURE: 98.06 F | DIASTOLIC BLOOD PRESSURE: 76 MMHG | SYSTOLIC BLOOD PRESSURE: 129 MMHG | OXYGEN SATURATION: 98 % | HEART RATE: 81 BPM

## 2017-11-20 VITALS — OXYGEN SATURATION: 98 %

## 2017-11-20 VITALS — OXYGEN SATURATION: 94 %

## 2017-11-20 LAB
ANION GAP SERPL CALC-SCNC: 8 MMOL/L (ref 3–11)
BASOPHILS # BLD: 0.04 K/UL (ref 0–0.2)
BASOPHILS NFR BLD: 0.5 %
BUN SERPL-MCNC: 22 MG/DL (ref 7–18)
BUN/CREAT SERPL: 13.8 (ref 10–20)
CALCIUM SERPL-MCNC: 8.8 MG/DL (ref 8.5–10.1)
CHLORIDE SERPL-SCNC: 107 MMOL/L (ref 98–107)
CO2 SERPL-SCNC: 25 MMOL/L (ref 21–32)
COMPLETE: YES
CREAT CL PREDICTED SERPL C-G-VRATE: 46.2 ML/MIN
CREAT SERPL-MCNC: 1.61 MG/DL (ref 0.6–1.4)
EOSINOPHIL NFR BLD AUTO: 264 K/UL (ref 130–400)
GLUCOSE SERPL-MCNC: 174 MG/DL (ref 70–99)
HCT VFR BLD CALC: 33.1 % (ref 42–52)
IG%: 0.5 %
IMM GRANULOCYTES NFR BLD AUTO: 22 %
LYMPHOCYTES # BLD: 1.78 K/UL (ref 1.2–3.4)
MCH RBC QN AUTO: 27.6 PG (ref 25–34)
MCHC RBC AUTO-ENTMCNC: 31.7 G/DL (ref 32–36)
MCV RBC AUTO: 87.1 FL (ref 80–100)
MONOCYTES NFR BLD: 9 %
NEUTROPHILS # BLD AUTO: 4.1 %
NEUTROPHILS NFR BLD AUTO: 63.9 %
PMV BLD AUTO: 9 FL (ref 7.4–10.4)
POTASSIUM SERPL-SCNC: 4.3 MMOL/L (ref 3.5–5.1)
RBC # BLD AUTO: 3.8 M/UL (ref 4.7–6.1)
SODIUM SERPL-SCNC: 140 MMOL/L (ref 136–145)
WBC # BLD AUTO: 8.1 K/UL (ref 4.8–10.8)

## 2017-11-20 RX ADMIN — INSULIN ASPART SCH UNITS: 100 INJECTION, SOLUTION INTRAVENOUS; SUBCUTANEOUS at 12:49

## 2017-11-20 RX ADMIN — GABAPENTIN SCH MG: 100 CAPSULE ORAL at 07:43

## 2017-11-20 RX ADMIN — ALBUTEROL SULFATE SCH PUFFS: 90 AEROSOL, METERED RESPIRATORY (INHALATION) at 07:44

## 2017-11-20 RX ADMIN — ATORVASTATIN CALCIUM SCH MG: 10 TABLET, FILM COATED ORAL at 07:43

## 2017-11-20 RX ADMIN — INSULIN ASPART SCH UNITS: 100 INJECTION, SOLUTION INTRAVENOUS; SUBCUTANEOUS at 09:08

## 2017-11-20 RX ADMIN — MUPIROCIN SCH APPLN: 20 OINTMENT TOPICAL at 07:44

## 2017-11-20 RX ADMIN — AMLODIPINE BESYLATE SCH MG: 5 TABLET ORAL at 07:43

## 2017-11-20 RX ADMIN — ALBUTEROL SULFATE SCH PUFFS: 90 AEROSOL, METERED RESPIRATORY (INHALATION) at 17:39

## 2017-11-20 RX ADMIN — ALBUTEROL SULFATE SCH PUFFS: 90 AEROSOL, METERED RESPIRATORY (INHALATION) at 12:27

## 2017-11-20 RX ADMIN — SODIUM CHLORIDE SCH MLS/HR: 900 INJECTION, SOLUTION INTRAVENOUS at 09:56

## 2017-11-20 RX ADMIN — STANDARDIZED SENNA CONCENTRATE AND DOCUSATE SODIUM SCH TAB: 8.6; 5 TABLET ORAL at 07:43

## 2017-11-20 RX ADMIN — VANCOMYCIN HYDROCHLORIDE SCH MLS/HR: 1 INJECTION, POWDER, LYOPHILIZED, FOR SOLUTION INTRAVENOUS at 12:29

## 2017-11-20 RX ADMIN — INSULIN ASPART SCH UNITS: 100 INJECTION, SOLUTION INTRAVENOUS; SUBCUTANEOUS at 17:39

## 2017-11-20 RX ADMIN — FINASTERIDE SCH MG: 5 TABLET, FILM COATED ORAL at 07:43

## 2017-11-20 NOTE — PROGRESS NOTE
Medicine Progress Note


Date & Time of Visit:


Nov 20, 2017 at 14:58.


Subjective


seen resting in bed, comfortable


just had lunch


states he feels fine overall, much better


denies fever/chills, abdominal pain


no chest pain, dyspnea, dizziness


no bleeding per urethra, no urinary symptoms


back to baseline


states he is ready and would like to be discharged today





Objective





Last 8 Hrs








  Date Time  Temp Pulse Resp B/P (MAP) Pulse Ox O2 Delivery O2 Flow Rate FiO2


 


11/20/17 08:00     94 Room Air  


 


11/20/17 07:16 36.9 88 20 149/79 (102) 94 Room Air  








Physical Exam:


General- oriented x 2, not in distress, speaks in sentences with no effort





Lungs- clear breath sounds bilaterally, no rales/wheezes





Heart- regular rhythm; no murmur, normal rate





Abdomen- normal bowel sounds, soft, nontender


       (+) wade cath in place- no active bleeding per urethra





Extremities- no pretibial edema, no calf tenderness





Neuro- alert, oriented x 3; no gross focal deficits





Skin- warm & dry


Laboratory Results:





Last 24 Hours








Test


  11/19/17


16:37 11/19/17


19:34 11/20/17


07:00 11/20/17


07:35


 


Bedside Glucose 90 mg/dl  208 mg/dl   170 mg/dl 


 


Stool Occult Blood   NEGATIVE  


 


Test


  11/20/17


07:52 11/20/17


11:27 11/20/17


11:30 


 


 


White Blood Count 8.10 K/uL    


 


Red Blood Count 3.80 M/uL    


 


Hemoglobin 10.5 g/dL    


 


Hematocrit 33.1 %    


 


Mean Corpuscular Volume 87.1 fL    


 


Mean Corpuscular Hemoglobin 27.6 pg    


 


Mean Corpuscular Hemoglobin


Concent 31.7 g/dl 


  


  


  


 


 


Platelet Count 264 K/uL    


 


Mean Platelet Volume 9.0 fL    


 


Neutrophils (%) (Auto) 63.9 %    


 


Lymphocytes (%) (Auto) 22.0 %    


 


Monocytes (%) (Auto) 9.0 %    


 


Eosinophils (%) (Auto) 4.1 %    


 


Basophils (%) (Auto) 0.5 %    


 


Neutrophils # (Auto) 5.18 K/uL    


 


Lymphocytes # (Auto) 1.78 K/uL    


 


Monocytes # (Auto) 0.73 K/uL    


 


Eosinophils # (Auto) 0.33 K/uL    


 


Basophils # (Auto) 0.04 K/uL    


 


RDW Standard Deviation 45.0 fL    


 


RDW Coefficient of Variation 14.3 %    


 


Immature Granulocyte % (Auto) 0.5 %    


 


Immature Granulocyte # (Auto) 0.04 K/uL    


 


Sodium Level 140 mmol/L    


 


Potassium Level 4.3 mmol/L    


 


Chloride Level 107 mmol/L    


 


Carbon Dioxide Level 25 mmol/L    


 


Anion Gap 8.0 mmol/L    


 


Blood Urea Nitrogen 22 mg/dl    


 


Creatinine 1.61 mg/dl    


 


Est Creatinine Clear Calc


Drug Dose 46.2 ml/min 


  


  


  


 


 


Estimated GFR (


American) 49.1 


  


  


  


 


 


Estimated GFR (Non-


American 42.4 


  


  


  


 


 


BUN/Creatinine Ratio 13.8    


 


Random Glucose 174 mg/dl    


 


Calcium Level 8.8 mg/dl    


 


Vancomycin Level Trough  14.5 mcg/ml   


 


Bedside Glucose   228 mg/dl  











Assessment & Plan


This is a 73yo M who resides at [facility] with a PMH of DM II (uncontrolled)

, HTN, HLD, CKD III and urinary retention (with a wade) who presents with 

dysuria, penile pain and generalized weakness.





Sepsis 2/2 MRSA UTI, Bacteremia


- presented hypotensive at 93/63, tachycardic at 110 


- BP normotensive, HR improved after IVF resuscitation 


- Leukocytosis of 25, POC Lactate of 1.09





-- remained afebrile


   leukocytosis resolved


   given IV fluids





-- management of UTI and possible PNA as noted below





UTI, with Bacteremia MRSA


Chronic Urinary Retention, Chronic Wade, BPH


-UA with large amount of leuk esterase, nitrite 


-Likely 2/2 urinary retention, wade in place long term 


-- Nasal MRSA:


   Positive


   Urine culture:


   Staph MRSA


   Blood Cultures x 2:


   Staph MRSA


-- given Vancomycin IV x 5  Days 


   Wade cath replaced by Urologist Dr. Greer, ff up with Dr. Greer as 

outpatient in 1-2 weeks


   ID  Dr. Alvarez consulted, recommend total of 2 weeks IV Vancomycin, will need 

Vancomycin IV x 9 more days   


   check with Dr. Alvarez re: CBC, PRP, Vanco level monitoring, ff up with Dr. Alvarez in 1-2 weeks


   monitor PICC line





Pneumonia ruled out


CHF exacerbation unlikely





Hypoxia, Resolved


- possible Atelectasis?


-- lung sounds clear


   CT chest: no signs of pneumonia


   weaned off oxygen





ACUTE RENAL FAILURE on CKD III: 


-Cr elevated to 2.17 (baseline is 1.2)


-Likely multifactorial: poor PO intake in addition to possible obstruction 2/2 

BPH, urinary retention 


-Denies NSAID use





-- likely from Sepsis, Post Renal- Obstruction


-- crea improving 2.17 --> 1.67--> 1.59--> 1.6


   given IV fluids


-- monitor crea closely 


  avoid NSAIDs, Nephrotoxins





Acute Blood Loss Anemia on Chronic anemia: 


-- Hg down to 7.0


   likely from Hematuria


-- Hemoccult negative


-- s/p  2 units PRBC transfusion


   Hg improved to 9


--  Anemia panel: iron normal


-- monitor Hg





DM II: 


A1c 11.1


- recommendation:


* Basal insulin:             Lantus 15 units every 24 hours given at bedtime





* Correctional Insulin:   Novolog Correction per scale ACHS


                         Goal Range: Low 110 mg/dL - High 140 mg/dL


                         Correction Factor: 25 mg/dL/unit





* Prandial insulin:         Per carb ratio of 1 unit per 8 grams CHO consumed





-- monitor





HTN: 


-Normotensive 


-Cont home amlodipine with hold parameters 





HLD: 


-Continue statin





DVT Ppx: SCDs only in light of hematuria, anemia





Code status: FULL 





PCP: Dr. Covarrubias 





Dispo: 


discharge to [facility]


ff up with PCP Dr. Stephanie Covarrubias in 1 week.


[facility] with [facility] in 1 week.


ff up with Urologist Dr. Greer in 1-2 weeks.


Current Inpatient Medications:





Current Inpatient Medications








 Medications


  (Trade)  Dose


 Ordered  Sig/Erika


 Route  Start Time


 Stop Time Status Last Admin


Dose Admin


 


 Acetaminophen


  (Tylenol Tab)  650 mg  Q4H  PRN


 PO  11/16/17 14:00


 12/16/17 13:59  11/17/17 20:09


650 MG


 


 Ondansetron HCl


  (Zofran Inj)  4 mg  Q6H  PRN


 IV  11/16/17 14:00


 12/16/17 13:59   


 


 


 Insulin Aspart


  (novoLOG ASPART)  **SLIDING


 SCALE**


 **If C...  ACHS


 SC  11/16/17 16:00


 12/16/17 15:59  11/20/17 12:49


10 UNITS


 


 Glucose


  (Glucose 40% Gel)  15-30


 GRAMS 15


 GRAMS...  UD  PRN


 PO  11/16/17 14:00


 12/16/17 13:59   


 


 


 Glucose


  (Glucose Chew


 Tab)  4-8


 Tablets 4


 Tabl...  UD  PRN


 PO  11/16/17 14:00


 12/16/17 13:59   


 


 


 Dextrose


  (Dextrose 50%


 50ML Syringe)  25-50ML OF


 50% DW IV


 FOR...  UD  PRN


 IV  11/16/17 14:00


 12/16/17 13:59   


 


 


 Glucagon


  (Glucagon Inj)  1 mg  UD  PRN


 SQ  11/16/17 14:00


 12/16/17 13:59   


 


 


 Amlodipine


 Besylate


  (Norvasc Tab)  5 mg  DAILY


 PO  11/17/17 09:00


 12/17/17 08:59  11/20/17 07:43


5 MG


 


 Atorvastatin


 Calcium


  (Lipitor Tab)  10 mg  DAILY


 PO  11/17/17 09:00


 12/17/17 08:59  11/20/17 07:43


10 MG


 


 Finasteride


  (Proscar Tab)  5 mg  QAM


 PO  11/17/17 09:00


 12/17/17 08:59  11/20/17 07:43


5 MG


 


 Gabapentin


  (Neurontin Cap)  100 mg  DAILY


 PO  11/17/17 09:00


 12/17/17 08:59  11/20/17 07:43


100 MG


 


 Tamsulosin HCl


  (Flomax Cap)  0.4 mg  HS


 PO  11/16/17 21:00


 12/16/17 20:59  11/19/17 19:37


0.4 MG


 


 Vancomycin HCl


  (Consult)  1 ea  UD  PRN


 N/A  11/16/17 14:45


 12/16/17 14:44   


 


 


 Albuterol


  (Ventolin Hfa


 Inhaler)  2 puffs  QID


 INH  11/16/17 17:00


 12/16/17 16:59  11/20/17 12:27


2 PUFFS


 


 Miscellaneous


 Information


  (Consult


 Glycemic


 Management


 Pharmacy)  1 ea  UD


 N/A  11/16/17 15:42


 12/16/17 15:41   


 


 


 Sodium Chloride  1,000 ml @ 


 75 mls/hr  [id number]


 IV  11/17/17 11:00


 12/17/17 10:59  11/20/17 09:56


75 MLS/HR


 


 Mupirocin


  (Bactroban 2%


 Oint)  1 appln  DAILY


 EXT  11/17/17 11:00


 11/21/17 09:01  11/20/17 07:44


1 APPLN


 


 Senna/Docusate


 Sodium


  (Senokot S Tab)  1 tab  QAM


 PO  11/18/17 09:00


 12/18/17 08:59  11/20/17 07:43


1 TAB


 


 Polyethylene


  (Miralax Powder


 Packet)  17 gm  DAILY  PRN


 PO  11/17/17 11:00


 12/17/17 10:59   


 


 


 Bisacodyl


  (Dulcolax Supp)  10 mg  DAILY  PRN


 WV  11/17/17 11:00


 12/17/17 10:59   


 


 


 Heparin Sodium


  (Porcine)


  (Heparin 10 Unit/


 ml 5 ml Flush)  5 ml  PRN  PRN


 FLUSH  11/19/17 23:45


 12/19/17 23:44   


 


 


 Insulin Glargine


  (Lantus Solostar


 Pen)  18 units  HS


 SC  11/20/17 22:00


 12/20/17 21:59   


 


 


 Vancomycin HCl


 1500 mg/Sodium


 Chloride  530 ml @ 


 200 mls/hr  Q24H


 IV  11/21/17 10:00


 11/30/17 09:59

## 2017-11-20 NOTE — CODING QUERY MEDICAL NECESSITY
SUPPORTING DIAGNOSIS NEEDED





A supporting diagnosis is required for the test/procedure performed on this patient in 
order for us to be reimbursed by the patient's insurance. Please provide a supporting 
diagnosis for the following test/procedure listed below next to the test name along with 
your signature. 



*If there is no additional diagnosis for this patient that would support the following 
test/procedure please document that below next to the test/procedure.



Test(s)/Procedure(s) that require a supporting diagnosis:







* PARTIAL RENAL PROFILE                              DIAGNOSIS:







Provider Signature:  ______________________________  Date:  _______



Thank you  

Iwona Romano

[employer]

Phone:  [phone]

Fax:  [phone]



Once completed, please kindly fax back to [phone]



For questions please call [phone]

## 2017-11-20 NOTE — DISCHARGE SUMMARY
Discharge Summary


Date of Service


Nov 20, 2017.





Discharge Summary


Admission Date:


Nov 16, 2017 at 13:48


Discharge Date:  Nov 20, 2017


Principal Diagnosis:


Sepsis secondary MRSA UTI, Bacteremia


Secondary Diagnoses/Problems:


Please refer to hospital course below.


Procedures:


CHEST ONE VIEW PORTABLE





CLINICAL HISTORY: Weakness    





COMPARISON STUDY:  9/11/2017





FINDINGS: The heart is normal in size. There is aortic tortuosity/ectasia. There


is diffuse elevation of the interstitium. Pulmonary vascular congestion is


suspected. A bilateral interstitial inflammatory process could appear similar


but is felt to be less likely. Clinical and radiographic follow-up is


recommended. There are no pleural effusions.[ 





IMPRESSION: Diffuse elevation of the interstitium. This likely represents


pulmonary vascular congestion although a bilateral interstitial inflammatory


process could appear similar. Clinical and radiographic follow-up is


recommended.











CT HEAD WITHOUT CONTRAST (CT)





CLINICAL HISTORY: fall, confusion HEAD PAIN





COMPARISON STUDY:  No previous studies for comparison.





TECHNIQUE:  Axial CT of the brain is performed from the vertex to the skull


base. IV contrast was not administered for this examination. A dose lowering


technique was utilized adhering to the principles of ALARA.


 





CT DOSE: 614.27 mGy.cm





FINDINGS:





No intra or extra-axial mass lesions are visualized. There is no CT evidence of


acute cortical infarction. There is no evidence of midline shift. There is no


acute  hemorrhage. No calvarial fractures are visualized. 


There are minimal white matter hypodensities likely on a small vessel basis.


There is basal ganglial mineralization.


There is no evidence of pathologic ventricular dilatation.


There is no evidence of acute sinusitis





IMPRESSION: No acute intracranial findings








KUB





HISTORY: History of urinary retention. Concern for possible acute


hydronephrosis.  R/o hydronephrosis 





COMPARISON: Renal ultrasound 9/12/2017.





FINDINGS: Mildly dilated loops of small bowel are seen within the midabdomen


measuring up to 3.2 cm with air also noted throughout the colon. Moderate to


extensive volume of formed stool seen throughout the colon. There is no


organomegaly.  No renal calculi. No ureteral calculi. No pneumoperitoneum or


pneumatosis. No fracture. Degenerative changes of the pelvis, hips and spine are


noted. Vascular calcifications are seen. Probable phleboliths.





IMPRESSION:  





1. No definite urolith identified, however renal shadows are obscured by bowel


gas.


2. Moderate to extensive volume of formed colonic stool throughout suggests


constipation.


3. Mildly dilated loops of small bowel within the midabdomen suggest ileus or


low-grade small bowel obstruction.





(CHEST) THORAX WITHOUT





CT DOSE: 434.51 mGy.cm





HISTORY: Dyspnea. Infection.  r/o pneumonia





TECHNIQUE: Multiaxial CT images of the chest were performed without contrast.  A


dose lowering technique was utilized adhering to the principles of ALARA.








COMPARISON: None.





FINDINGS: Mild bibasilar atelectasis. Slight basilar interstitial prominence. No


well-defined focal infiltrate.





Moderate abscess chronic change and ectasia thoracic aorta. No evidence for


aneurysm.





IMPRESSION: 





1. Mild chronic basilar interstitial and atelectatic change.


2. No focal infiltrate


Consultations:


Urologist Dr. Gregorio, ID Dr. Alvarez


Pending Studies/Follow-Up:


Please refer to hospital course below.





Medication Reconciliation


New Medications:  


Vancomycin HCl in Sodium Chlor (VANCOMYCIN in NSS) 1 Inj Inj


1500 MG IV Q24H for 9 Days, #9 BAG 0 Refills


In ___500_____ ml NSS


Insulin Aspart (Novolog Flexpen) 100 Units/Ml Inj


0 UNITS SC ACHS for 30 Days





 Correctional Insulin:   Novolog Correction per scale ACHS


                    Goal Range: Low 110 mg/dL - High 140 mg/dL


                    Correction Factor: 25 mg/dL/unit


 


 Prandial insulin:         Per carb ratio of 1 unit per 8 grams CHO consumed


Insulin Glargine (Lantus Solostar) 100 Unit/Ml Inj


18 UNITS SC HS for 30 Days





Mupirocin (Mupirocin) 66 Appln/22 Gm Oint


1 APPLN EXT DAILY for 2 Days, #2 TUBE 0 Refills





Sennosides-Docusate Sodium (Senokot S) 1 Tab Tab


1 TAB PO QAM for 30 Days, #30 TAB 2 Refills





 


Continued Medications:  


Acetaminophen (Tylenol) 500 Mg Tab


500 MG PO Q4 PRN for Pain


MAX 3 GM APAP/24 HRS


Albuterol Sulfate (Proair Respiclick) 108 Mcg/Act Aer


2 PUFFS INH QID





Amlodipine (Norvasc) 5 Mg Tab


5 MG PO DAILY





Atorvastatin (Lipitor) 10 Mg Tab


10 MG PO DAILY, TAB





Finasteride (Finasteride) 5 Mg Tab


5 MG PO QAM for 30 Days, #30 TAB





Gabapentin (Neurontin) 100 Mg Cap


100 MG PO DAILY, CAP





Polyethylene Glycol 3350 (Qc Natura-Lax) 1 Pow Pow


17 GM PO DAILY PRN for Constipation





Tamsulosin HCl (Tamsulosin HCl) 0.4 Mg Cap


0.4 MG PO HS for 30 Days, #30 CAP





 


Discontinued Medications:  


Aspirin Enteric Coated (Ecotrin Or Generic) 81 Mg Tab


81 MG PO QAM, TAB





Insulin Glargine (Lantus) 100 Unit/Ml Inj


40 UNITS SC DAILY





Insulin Human Regular (Humulin R) 1 Ea Inj


0 SC


DM- =0U


 131-180=4U


 181-240=8U


 241-300=10U


 301-350=12U


 351-400=16U


 >400=20U AND CALL MD


Zinc Oxide (Topical) (Desitin) 40 % Oin


1 APPLN TOP HS


APPLY TO THE PENIS AT BEDTIME AND AS NEEDED








Admission Information


HPI (per Admitting provider):


This is a 73yo M who resides at [facility] with a PMH of DM II (uncontrolled)

, HTN, HLD, CKD III and urinary retention (with a emerson) who presents with 

dysuria, penile pain and generalized weakness. Per nursing home and patient, 

urinary symptoms have been present for a few days. Has had an indwelling emerson 

catheter for the past month, which was changed 48 hours ago when the nurses 

noticed penile discharge. Patient was transferring from his wheelchair to his 

bed today when he lost his balance and fell backward and hit his head. Was 

brought to ER for further evaluation. 





Patient is A&O x4 despite endorsing intermittent confusion. Was found to have 

an elevated white count of 25,000 and was hypoxic on room air. Now O2 

saturation is in the high 90s on 2L NC. Denies using oxygen at [facility]. 

States that he still has a cough and nasal congestion from a URI last week. 

Denies any fever, chills, lightheadedness, visual changes, sore throat, CP, 

palpitations, SOB, abd pain, nausea, vomiting or LE swelling.


Physical Exam (per Admitting):  


   General Appearance:  WD/WN, no apparent distress


   Head:  normocephalic, atraumatic


   Eyes:  normal inspection, PERRL, sclerae normal, + pertinent finding (Blind 

in R eye )


   ENT:  normal ENT inspection, hearing grossly normal, pharynx normal (dry 

mucous membranes ), + nasal congestion


   Neck:  supple, no JVD, trachea midline


   Respiratory/Chest:  chest non-tender, lungs clear, normal breath sounds, no 

respiratory distress, no accessory muscle use, + pertinent finding (Breathing 

comfortably on 2L NC)


   Cardiovascular:  no murmur, normal peripheral pulses, + tachycardia


   Abdomen/GI:  normal bowel sounds, non tender, soft, no organomegaly


   Genitourinary - Male:  normal male genitalia, + pertinent finding (Emerson 

catheter in place. Marked pain when nurse irrigated emerson. + pyuria )


   Back:  normal inspection


   Extremities/Musculoskelatal:  normal inspection, no calf tenderness, no 

pedal edema, non-tender


   Neurologic/Psych:  no motor/sensory deficits, alert, normal mood/affect, 

oriented x 3


   Skin:  normal color, warm/dry





Hospital Course


This is a 73yo M who resides at [facility] with a PMH of DM II (uncontrolled)

, HTN, HLD, CKD III and urinary retention (with a emerson) who presents with 

dysuria, penile pain and generalized weakness.





Sepsis 2/2 MRSA UTI, Bacteremia


- presented hypotensive at 93/63, tachycardic at 110 


- BP normotensive, HR improved after IVF resuscitation 


- Leukocytosis of 25, POC Lactate of 1.09





-- remained afebrile


   leukocytosis resolved


   given IV fluids





-- management of UTI and possible PNA as noted below





UTI, with Bacteremia MRSA


Chronic Urinary Retention, Chronic Emerson, BPH


-UA with large amount of leuk esterase, nitrite 


-Likely 2/2 urinary retention, emerson in place long term 


-- Nasal MRSA:


   Positive


   Urine culture:


   Staph MRSA


   Blood Cultures x 2:


   Staph MRSA


-- given Vancomycin IV x 5  Days 


   Emerson cath replaced by Urologist Dr. Gregorio, ff up with Dr. Gregorio as 

outpatient in 1-2 weeks


   ID  Dr. Alvarez consulted, recommend total of 2 weeks IV Vancomycin, will need 

Vancomycin IV x 9 more days   


   check with Dr. Alvarez re: CBC, PRP, Vanco level monitoring, ff up with Dr. Alvarez in 1-2 weeks


   monitor PICC line





Pneumonia ruled out


CHF exacerbation unlikely





Hypoxia, Resolved


- possible Atelectasis?


-- lung sounds clear


   CT chest: no signs of pneumonia


   weaned off oxygen





ACUTE RENAL FAILURE on CKD III: 


-Cr elevated to 2.17 (baseline is 1.2)


-Likely multifactorial: poor PO intake in addition to possible obstruction 2/2 

BPH, urinary retention 


-Denies NSAID use





-- likely from Sepsis, Post Renal- Obstruction


-- crea improving 2.17 --> 1.67--> 1.59--> 1.6


   given IV fluids


-- monitor crea closely 


  avoid NSAIDs, Nephrotoxins





Acute Blood Loss Anemia on Chronic anemia: 


-- Hg down to 7.0


   likely from Hematuria


-- Hemoccult negative


-- s/p  2 units PRBC transfusion


   Hg improved to 9


--  Anemia panel: iron normal


-- monitor Hg





DM II: 


A1c 11.1


- recommendation:


* Basal insulin:             Lantus 15 units every 24 hours given at bedtime





* Correctional Insulin:   Novolog Correction per scale ACHS


                         Goal Range: Low 110 mg/dL - High 140 mg/dL


                         Correction Factor: 25 mg/dL/unit





* Prandial insulin:         Per carb ratio of 1 unit per 8 grams CHO consumed





-- monitor





HTN: 


-Normotensive 


-Cont home amlodipine with hold parameters 





HLD: 


-Continue statin





DVT Ppx: SCDs only in light of hematuria, anemia





Code status: FULL 





PCP: Dr. Covarrubias 





Dispo: 


discharge to [facility]


ff up with PCP Dr. Stephanie Covarrubias in 1 week.


ff up with [facility] in 1 week.


ff up with Urologist Dr. Gregorio in 1-2 weeks.


Total time spent on discharge = 45 minutes


This includes examination of the patient, discharge planning, medication 

reconciliation, and communication with other providers.





Discharge Instructions


Discharge Instructions


Date of Service


Nov 20, 2017.





Admission


Reason for Admission:  Hypoxia, Pneumonia





Discharge


Discharge Diagnosis / Problem:  SEPSIS SECONDARY TO MRSA URINARY TRACT INFECTION

, BACTEREMIA





Discharge Goals


Goal(s):  Diagnostic testing, Therapeutic intervention





Activity Recommendations


Activity Level:  Assistance Required


Therapies:  Physical Therapy, Occupational Therapy





.





Additional Information


Patient informed of condition:  Yes


Advance Directives:  No (UKNOWN)


DNR:  No (PATIENT IS FULL CODE)


Level of Care:  Skilled


Communicable Disease:  No


Prognosis:  Stable


Emerson Catheter:  Yes





Instructions / Follow-Up


Instructions / Follow-Up


MONITOR VANCO LEVEL, CBC, PRP AS PER ID SPECIALIST INSTRUCTIONS- DR. ALVAREZ. 

FOLLOW UP WITH DR. ALVAREZ IN 1 WEEK.


MONITOR EMERSON CATHETER FOR BLEEDING. FOLLOW UP WITH UROLOGIST DR. GREGORIO IN 1-

2 WEEKS.


MONITOR CREA CLOSELY. NO NSAIDS, NEPHROTOXINS. 





FOLLOW UP WITH PRIMARY CARE PHYSICIAN DR. JOHN GALLO ON FRIDAY 11/24/17 AT 10:

45AM.





PLEASE REFER TO ACCOMPANYING HOSPITAL DISCHARGE SUMMARY FOR FURTHER DETAILS.





Current Hospital Diet


Patient's current hospital diet: Diabetes Type 2 Diet, AHA Diet (Heart Healthy)





Discharge Diet


Recommended Diet:  AHA Diet (Heart Healthy), Diabetes Type 2 Diet


Diet Texture:  Dental Soft (bite-sized)





Procedures


Procedures Performed:  


CT HEAD, CT CHEST, KUB XRAY





Pending Studies


Studies pending at discharge:  yes


List of pending studies:  


REPEAT CBC, PRP, VANCO LEVEL





Physician Orders On Transfer


Special Precautions:


MONITOR VANCO LEVEL, CBC, PRP AS PER ID SPECIALIST INSTRUCTIONS- DR. ALVAREZ. 

FOLLOW UP WITH DR. ALVAREZ IN 1 WEEK.


MONITOR EMERSON CATHETER FOR BLEEDING. FOLLOW UP WITH UROLOGIST DR. GREGORIO IN 1-

2 WEEKS.


MONITOR CREA CLOSELY. NO NSAIDS, NEPHROTOXINS. 





FOLLOW UP WITH PRIMARY CARE PHYSICIAN DR. JOHN GALLO ON FRIDAY 11/24/17 AT 10:

45AM.





PLEASE REFER TO ACCOMPANYING HOSPITAL DISCHARGE SUMMARY FOR FURTHER DETAILS.





Laboratory Results





Hemoglobin A1c








Test


  11/17/17


05:53 Range/Units


 


 


Estimated Average Glucose 272   mg/dl


 


Hemoglobin A1c 11.1 H 4.5-5.6  %











Medical Emergencies








.


Who to Call and When:





Medical Emergencies:  If at any time you feel your situation is an emergency, 

please call 911 immediately.





.





Non-Emergent Contact


Non-Emergency issues call your:  Primary Care Provider, Urologist


Call Non-Emergent contact if:  you have a fever, your pain is not controlled, 

your pain is worsening, wound has increased drainage, wound has increased 

redness, wound has increased pain, you have any medication questions





.


.








"Provider Documentation" section prepared by Avi Garcia.








.





Core Measure Problem


Core Measures:  None

## 2017-11-20 NOTE — PHARMACY PROGRESS NOTE
Pharmacy Antibiotic Prog Note


Date of Service


Nov 20, 2017.





Subjective


The patient is currently receiving vancomycin 1250 mg iv q 24 hrs 





The patient is currently on day # 5 of IV therapy.





Objective


Height (Feet):  6


Height (Inches):  0.00


Weight (Kilograms):  79.500


Levels:











Item Value  Date Time


 


Vancomycin Level Trough 14.5 mcg/ml 11/20/17 1127








Lab Results (24hrs):











Test


  11/20/17


07:00 11/20/17


07:35 11/20/17


07:52 11/20/17


11:27


 


Stool Occult Blood


  NEGATIVE


(NEGATIVE) 


  


  


 


 


Bedside Glucose


  


  170 mg/dl


(70-99) 


  


 


 


White Blood Count


  


  


  8.10 K/uL


(4.8-10.8) 


 


 


Red Blood Count


  


  


  3.80 M/uL


(4.7-6.1) 


 


 


Hemoglobin


  


  


  10.5 g/dL


(14.0-18.0) 


 


 


Hematocrit   33.1 % (42-52)  


 


Mean Corpuscular Volume


  


  


  87.1 fL


() 


 


 


Mean Corpuscular Hemoglobin


  


  


  27.6 pg


(25-34) 


 


 


Mean Corpuscular Hemoglobin


Concent 


  


  31.7 g/dl


(32-36) 


 


 


Platelet Count


  


  


  264 K/uL


(130-400) 


 


 


Mean Platelet Volume


  


  


  9.0 fL


(7.4-10.4) 


 


 


Neutrophils (%) (Auto)   63.9 %  


 


Lymphocytes (%) (Auto)   22.0 %  


 


Monocytes (%) (Auto)   9.0 %  


 


Eosinophils (%) (Auto)   4.1 %  


 


Basophils (%) (Auto)   0.5 %  


 


Neutrophils # (Auto)


  


  


  5.18 K/uL


(1.4-6.5) 


 


 


Lymphocytes # (Auto)


  


  


  1.78 K/uL


(1.2-3.4) 


 


 


Monocytes # (Auto)


  


  


  0.73 K/uL


(0.11-0.59) 


 


 


Eosinophils # (Auto)


  


  


  0.33 K/uL


(0-0.5) 


 


 


Basophils # (Auto)


  


  


  0.04 K/uL


(0-0.2) 


 


 


RDW Standard Deviation


  


  


  45.0 fL


(36.4-46.3) 


 


 


RDW Coefficient of Variation


  


  


  14.3 %


(11.5-14.5) 


 


 


Immature Granulocyte % (Auto)   0.5 %  


 


Immature Granulocyte # (Auto)


  


  


  0.04 K/uL


(0.00-0.02) 


 


 


Sodium Level


  


  


  140 mmol/L


(136-145) 


 


 


Potassium Level


  


  


  4.3 mmol/L


(3.5-5.1) 


 


 


Chloride Level


  


  


  107 mmol/L


() 


 


 


Carbon Dioxide Level


  


  


  25 mmol/L


(21-32) 


 


 


Anion Gap


  


  


  8.0 mmol/L


(3-11) 


 


 


Blood Urea Nitrogen


  


  


  22 mg/dl


(7-18) 


 


 


Creatinine


  


  


  1.61 mg/dl


(0.60-1.40) 


 


 


Est Creatinine Clear Calc


Drug Dose 


  


  46.2 ml/min 


  


 


 


Estimated GFR (


American) 


  


  49.1 


  


 


 


Estimated GFR (Non-


American 


  


  42.4 


  


 


 


BUN/Creatinine Ratio   13.8 (10-20)  


 


Random Glucose


  


  


  174 mg/dl


(70-99) 


 


 


Calcium Level


  


  


  8.8 mg/dl


(8.5-10.1) 


 


 


Vancomycin Level Trough


  


  


  


  14.5 mcg/ml


(SEE COMMENT)


 


Test


  11/20/17


11:30 


  


  


 


 


Bedside Glucose


  228 mg/dl


(70-99) 


  


  


 








Micro Results:











Item Value  Date Time


 


Urine Culture - Final Complete 11/17/17 1112





Urine,Catheterized Staphylococcus Aureus 


 


MRSA DNA Surveillance Screen - Final Complete 11/16/17 1700





Nasal Specimen Positive for MRSA by DNA Probe 


 


Blood Culture - Final Complete 11/16/17 1146





Blood Staphylococcus Aureus 


 


Blood Culture - Preliminary Resulted 11/16/17 1141





Blood Staphylococcus Aureus 











Assessment & Plan


Patient on vancomycin for MRSA bacteremia/PNA. ID is following the patient. 





Vancomycin:


* Trough level this am was slightly subtherapeutic at 14.5 mcg/ml (goal 15-20 

mcg/ml for bacteremia, ideally closer to ~20 mcg/ml)


* Will increase dose to vancomycin 1500 mg iv q 24 hrs to target a higher trough


* Will plan to obtain trough in 3-4 days, or sooner if renal function changes








Pharmacy will continue to follow and will adjust dose/frequency as necessary.  

Thank you

## 2017-11-20 NOTE — DISCHARGE INSTRUCTIONS
Discharge Instructions


Date of Service


Nov 20, 2017.





Admission


Reason for Admission:  Hypoxia, Pneumonia





Discharge


Discharge Diagnosis / Problem:  SEPSIS SECONDARY TO MRSA URINARY TRACT INFECTION

, BACTEREMIA





Discharge Goals


Goal(s):  Diagnostic testing, Therapeutic intervention





Activity Recommendations


Activity Level:  Assistance Required


Therapies:  Physical Therapy, Occupational Therapy





.





Additional Information


Patient informed of condition:  Yes


Advance Directives:  No (UKNOWN)


DNR:  No (PATIENT IS FULL CODE)


Level of Care:  Skilled


Communicable Disease:  No


Prognosis:  Stable


Emerson Catheter:  Yes





Instructions / Follow-Up


Instructions / Follow-Up


MONITOR VANCO LEVEL, CBC, PRP AS PER ID SPECIALIST INSTRUCTIONS- DR. BLAKELY. 

FOLLOW UP WITH DR. BLAKELY IN 1 WEEK.


MONITOR EMERSON CATHETER FOR BLEEDING. FOLLOW UP WITH UROLOGIST DR. GREGORIO IN 1-

2 WEEKS.


MONITOR CREA CLOSELY. NO NSAIDS, NEPHROTOXINS. 





FOLLOW UP WITH PRIMARY CARE PHYSICIAN DR. JOHN GALLO ON FRIDAY 11/24/17 AT 10:

45AM.





PLEASE REFER TO ACCOMPANYING HOSPITAL DISCHARGE SUMMARY FOR FURTHER DETAILS.





Current Hospital Diet


Patient's current hospital diet: Diabetes Type 2 Diet, AHA Diet (Heart Healthy)





Discharge Diet


Recommended Diet:  AHA Diet (Heart Healthy), Diabetes Type 2 Diet


Diet Texture:  Dental Soft (bite-sized)





Procedures


Procedures Performed:  


CT HEAD, CT CHEST, KUB XRAY





Pending Studies


Studies pending at discharge:  yes


List of pending studies:  


REPEAT CBC, PRP, VANCO LEVEL





Physician Orders On Transfer


Special Precautions:


MONITOR VANCO LEVEL, CBC, PRP AS PER ID SPECIALIST INSTRUCTIONS- DR. BLAKELY. 

FOLLOW UP WITH DR. BLAKELY IN 1 WEEK.


MONITOR EMERSON CATHETER FOR BLEEDING. FOLLOW UP WITH UROLOGIST DR. GREGORIO IN 1-

2 WEEKS.


MONITOR CREA CLOSELY. NO NSAIDS, NEPHROTOXINS. 





FOLLOW UP WITH PRIMARY CARE PHYSICIAN DR. JOHN GALLO ON FRIDAY 11/24/17 AT 10:

45AM.





PLEASE REFER TO ACCOMPANYING HOSPITAL DISCHARGE SUMMARY FOR FURTHER DETAILS.





Laboratory Results





Hemoglobin A1c








Test


  11/17/17


05:53 Range/Units


 


 


Estimated Average Glucose 272   mg/dl


 


Hemoglobin A1c 11.1 H 4.5-5.6  %











Medical Emergencies








.


Who to Call and When:





Medical Emergencies:  If at any time you feel your situation is an emergency, 

please call 911 immediately.





.





Non-Emergent Contact


Non-Emergency issues call your:  Primary Care Provider, Urologist


Call Non-Emergent contact if:  you have a fever, your pain is not controlled, 

your pain is worsening, wound has increased drainage, wound has increased 

redness, wound has increased pain, you have any medication questions





.


.








"Provider Documentation" section prepared by Avi Garcia.








.





Core Measure Problem


Core Measures:  None

## 2017-11-21 ENCOUNTER — HOSPITAL ENCOUNTER (OUTPATIENT)
Dept: HOSPITAL 45 - C.LABUPNIT | Age: 72
Discharge: NURSING, REHAB OR LONG-TERM CARE | End: 2017-11-21
Attending: NURSE PRACTITIONER
Payer: COMMERCIAL

## 2017-11-21 DIAGNOSIS — E11.8: ICD-10-CM

## 2017-11-21 DIAGNOSIS — J18.8: Primary | ICD-10-CM

## 2017-11-21 LAB
ALBUMIN/GLOB SERPL: 0.6 {RATIO} (ref 0.9–2)
ALP SERPL-CCNC: 98 U/L (ref 45–117)
ALT SERPL-CCNC: 45 U/L (ref 12–78)
ANION GAP SERPL CALC-SCNC: 9 MMOL/L (ref 3–11)
AST SERPL-CCNC: 41 U/L (ref 15–37)
BASOPHILS # BLD: 0.03 K/UL (ref 0–0.2)
BASOPHILS NFR BLD: 0.3 %
BUN SERPL-MCNC: 21 MG/DL (ref 7–18)
BUN/CREAT SERPL: 13.3 (ref 10–20)
CALCIUM SERPL-MCNC: 8.8 MG/DL (ref 8.5–10.1)
CHLORIDE SERPL-SCNC: 108 MMOL/L (ref 98–107)
CO2 SERPL-SCNC: 25 MMOL/L (ref 21–32)
COMPLETE: YES
CREAT SERPL-MCNC: 1.59 MG/DL (ref 0.6–1.4)
EOSINOPHIL NFR BLD AUTO: 287 K/UL (ref 130–400)
GLOBULIN SER-MCNC: 4.1 GM/DL (ref 2.5–4)
GLUCOSE SERPL-MCNC: 135 MG/DL (ref 70–99)
HCT VFR BLD CALC: 32.4 % (ref 42–52)
IG%: 0.5 %
IMM GRANULOCYTES NFR BLD AUTO: 19.3 %
LYMPHOCYTES # BLD: 1.8 K/UL (ref 1.2–3.4)
MCH RBC QN AUTO: 27 PG (ref 25–34)
MCHC RBC AUTO-ENTMCNC: 31.2 G/DL (ref 32–36)
MCV RBC AUTO: 86.6 FL (ref 80–100)
MONOCYTES NFR BLD: 10 %
NEUTROPHILS # BLD AUTO: 3.7 %
NEUTROPHILS NFR BLD AUTO: 66.2 %
PMV BLD AUTO: 10 FL (ref 7.4–10.4)
POTASSIUM SERPL-SCNC: 4.3 MMOL/L (ref 3.5–5.1)
RBC # BLD AUTO: 3.74 M/UL (ref 4.7–6.1)
SODIUM SERPL-SCNC: 142 MMOL/L (ref 136–145)
WBC # BLD AUTO: 9.34 K/UL (ref 4.8–10.8)

## 2017-11-22 ENCOUNTER — HOSPITAL ENCOUNTER (OUTPATIENT)
Dept: HOSPITAL 45 - C.LABUPNIT | Age: 72
Discharge: NURSING, REHAB OR LONG-TERM CARE | End: 2017-11-22
Attending: NURSE PRACTITIONER
Payer: COMMERCIAL

## 2017-11-22 DIAGNOSIS — C18.8: Primary | ICD-10-CM

## 2017-11-29 ENCOUNTER — HOSPITAL ENCOUNTER (OUTPATIENT)
Dept: HOSPITAL 45 - C.LABUPNIT | Age: 72
Discharge: NURSING, REHAB OR LONG-TERM CARE | End: 2017-11-29
Attending: NURSE PRACTITIONER
Payer: COMMERCIAL

## 2017-11-29 DIAGNOSIS — Z51.81: ICD-10-CM

## 2017-11-29 DIAGNOSIS — D64.9: Primary | ICD-10-CM

## 2017-11-29 LAB
ANION GAP SERPL CALC-SCNC: 6 MMOL/L (ref 3–11)
BUN SERPL-MCNC: 27 MG/DL (ref 7–18)
BUN/CREAT SERPL: 15.8 (ref 10–20)
CALCIUM SERPL-MCNC: 8.8 MG/DL (ref 8.5–10.1)
CHLORIDE SERPL-SCNC: 105 MMOL/L (ref 98–107)
CO2 SERPL-SCNC: 28 MMOL/L (ref 21–32)
CREAT SERPL-MCNC: 1.72 MG/DL (ref 0.6–1.4)
GLUCOSE SERPL-MCNC: 112 MG/DL (ref 70–99)
HCT VFR BLD CALC: 31.6 % (ref 42–52)
POTASSIUM SERPL-SCNC: 4.2 MMOL/L (ref 3.5–5.1)
SODIUM SERPL-SCNC: 139 MMOL/L (ref 136–145)

## 2017-12-04 ENCOUNTER — HOSPITAL ENCOUNTER (OUTPATIENT)
Dept: HOSPITAL 45 - C.LABUPNIT | Age: 72
Discharge: NURSING, REHAB OR LONG-TERM CARE | End: 2017-12-04
Attending: NURSE PRACTITIONER
Payer: COMMERCIAL

## 2017-12-04 DIAGNOSIS — A41.02: ICD-10-CM

## 2017-12-04 DIAGNOSIS — D64.9: Primary | ICD-10-CM

## 2017-12-04 LAB
ANION GAP SERPL CALC-SCNC: 7 MMOL/L (ref 3–11)
BASOPHILS # BLD: 0.05 K/UL (ref 0–0.2)
BASOPHILS NFR BLD: 0.8 %
BUN SERPL-MCNC: 27 MG/DL (ref 7–18)
BUN/CREAT SERPL: 15.6 (ref 10–20)
CALCIUM SERPL-MCNC: 8.9 MG/DL (ref 8.5–10.1)
CHLORIDE SERPL-SCNC: 105 MMOL/L (ref 98–107)
CO2 SERPL-SCNC: 28 MMOL/L (ref 21–32)
COMPLETE: YES
CREAT SERPL-MCNC: 1.76 MG/DL (ref 0.6–1.4)
EOSINOPHIL NFR BLD AUTO: 310 K/UL (ref 130–400)
GLUCOSE SERPL-MCNC: 124 MG/DL (ref 70–99)
HCT VFR BLD CALC: 32.4 % (ref 42–52)
IG%: 0.2 %
IMM GRANULOCYTES NFR BLD AUTO: 29 %
LYMPHOCYTES # BLD: 1.88 K/UL (ref 1.2–3.4)
MCH RBC QN AUTO: 27.6 PG (ref 25–34)
MCHC RBC AUTO-ENTMCNC: 31.5 G/DL (ref 32–36)
MCV RBC AUTO: 87.6 FL (ref 80–100)
MONOCYTES NFR BLD: 12.8 %
NEUTROPHILS # BLD AUTO: 6.2 %
NEUTROPHILS NFR BLD AUTO: 51 %
PMV BLD AUTO: 10 FL (ref 7.4–10.4)
POTASSIUM SERPL-SCNC: 4.3 MMOL/L (ref 3.5–5.1)
RBC # BLD AUTO: 3.7 M/UL (ref 4.7–6.1)
SODIUM SERPL-SCNC: 140 MMOL/L (ref 136–145)
WBC # BLD AUTO: 6.49 K/UL (ref 4.8–10.8)

## 2017-12-13 ENCOUNTER — HOSPITAL ENCOUNTER (OUTPATIENT)
Dept: HOSPITAL 45 - C.LABBC | Age: 72
Discharge: HOME | End: 2017-12-13
Attending: NURSE PRACTITIONER
Payer: COMMERCIAL

## 2017-12-13 DIAGNOSIS — E78.5: ICD-10-CM

## 2017-12-13 DIAGNOSIS — N40.0: ICD-10-CM

## 2017-12-13 DIAGNOSIS — D64.9: ICD-10-CM

## 2017-12-13 DIAGNOSIS — E11.9: Primary | ICD-10-CM

## 2017-12-13 LAB
ANION GAP SERPL CALC-SCNC: 8 MMOL/L (ref 3–11)
BUN SERPL-MCNC: 36 MG/DL (ref 7–18)
BUN/CREAT SERPL: 17.3 (ref 10–20)
CALCIUM SERPL-MCNC: 8.5 MG/DL (ref 8.5–10.1)
CHLORIDE SERPL-SCNC: 104 MMOL/L (ref 98–107)
CO2 SERPL-SCNC: 25 MMOL/L (ref 21–32)
CREAT SERPL-MCNC: 2.05 MG/DL (ref 0.6–1.4)
EOSINOPHIL NFR BLD AUTO: 279 K/UL (ref 130–400)
EST. AVERAGE GLUCOSE BLD GHB EST-MCNC: 223 MG/DL
GLUCOSE SERPL-MCNC: 171 MG/DL (ref 70–99)
HCT VFR BLD CALC: 31 % (ref 42–52)
MCH RBC QN AUTO: 27.4 PG (ref 25–34)
MCHC RBC AUTO-ENTMCNC: 31.6 G/DL (ref 32–36)
MCV RBC AUTO: 86.6 FL (ref 80–100)
PMV BLD AUTO: 10.1 FL (ref 7.4–10.4)
POTASSIUM SERPL-SCNC: 4.2 MMOL/L (ref 3.5–5.1)
RBC # BLD AUTO: 3.58 M/UL (ref 4.7–6.1)
SODIUM SERPL-SCNC: 137 MMOL/L (ref 136–145)
WBC # BLD AUTO: 8.61 K/UL (ref 4.8–10.8)

## 2018-01-19 ENCOUNTER — HOSPITAL ENCOUNTER (OUTPATIENT)
Dept: HOSPITAL 45 - C.LABSPEC | Age: 73
Discharge: HOME | End: 2018-01-19
Attending: INTERNAL MEDICINE
Payer: COMMERCIAL

## 2018-01-19 DIAGNOSIS — D64.9: Primary | ICD-10-CM

## 2018-01-22 LAB
HEMOCCULT STL QL: NEGATIVE

## 2018-01-24 ENCOUNTER — HOSPITAL ENCOUNTER (OUTPATIENT)
Dept: HOSPITAL 45 - C.LABSPEC | Age: 73
Discharge: HOME | End: 2018-01-24
Attending: INTERNAL MEDICINE
Payer: COMMERCIAL

## 2018-01-24 DIAGNOSIS — D64.9: Primary | ICD-10-CM

## 2018-01-24 LAB
EOSINOPHIL NFR BLD AUTO: 277 K/UL (ref 130–400)
HCT VFR BLD CALC: 32.7 % (ref 42–52)
HGB BLD-MCNC: 10.2 G/DL (ref 14–18)
MCH RBC QN AUTO: 27 PG (ref 25–34)
MCHC RBC AUTO-ENTMCNC: 31.2 G/DL (ref 32–36)
MCV RBC AUTO: 86.5 FL (ref 80–100)
PMV BLD AUTO: 10.1 FL (ref 7.4–10.4)
RED CELL DISTRIBUTION WIDTH CV: 15.1 % (ref 11.5–14.5)
RED CELL DISTRIBUTION WIDTH SD: 47.9 FL (ref 36.4–46.3)
WBC # BLD AUTO: 8.8 K/UL (ref 4.8–10.8)

## 2018-01-25 NOTE — CODING QUERY NO DIAGNOSIS
:  1945



TREATMENT RENDERED WITHOUT A DIAGNOSIS                                                  



To promote full compliance with coding requirements relating to patient care, physician 
participation is requested in all cases of  uncertainty.  Please assist us with 
providing a diagnosis/symptom for the test(s) below:



A diagnosis/symptom was not documented on your Order.  A valid diagnosis/symptom is 
required to bill all insurances.



**Please remember that we are unable to code a diagnosis of rule out, probable, possible, 
questionable, or suspected.  



Tests that require a diagnosis:

DOS:  18



* CBC W/O DIFFERENTIAL      DIAGNOSIS:

* IRON        DIAGNOSIS:

* TOTAL IRON BINDING CAPACITY      DIAGNOSIS:













Provider Signature: _____________________________ Date: _________



Thank you  

Tori Pabon

Health Information Management

Phone:  [phone]

Fax:  [phone]



Once completed, please kindly fax back to [phone]



For questions please call [phone]

## 2018-03-07 NOTE — PAT MEDICATION INSTRUCTIONS
Service Date


Mar 7, 2018.





Current Home Medication List


Acetaminophen (Tylenol), 500 MG PO Q4 PRN for Pain


Albuterol Sulfate (Proair Respiclick), 2 PUFFS INH QID


Amlodipine (Norvasc), 5 MG PO DAILY


Atorvastatin (Lipitor), 10 MG PO DAILY


Finasteride (Proscar), 5 MG PO QAM


Gabapentin (Neurontin), 100 MG PO DAILY


Insulin Aspart (Novolog Penfill), INJ TIDM


Insulin Glargine (Lantus), 32 UNITS SC QPM


Sennosides (Senokot), 1 TAB PO QAM


Tamsulosin Hcl (Flomax), 0.4 MG PO QPM


Zinc Oxide (Topical) (Desitin), 1 DOSE TOP PRN


[Ciprofloxacin 0.03%], 1 DROP OPL QID PRN for D





Medication Instructions


For Your Scheduled Surgery 








- Hold the following medications 24 hours prior to surgery:


Zinc Oxide (Topical) (Desitin), 1 DOSE TOP PRN








- Hold the following medications the morning of surgery:


Insulin Aspart (Novolog Penfill), INJ TIDM


Sennosides (Senokot), 1 TAB PO QAM








- The following medications are OK to take the morning of surgery with a sip of 

water:


Acetaminophen (Tylenol), 500 MG PO Q4 PRN for Pain (if needed, can be taken up 

to four hours before surgery)


Albuterol Sulfate (Proair Respiclick), 2 PUFFS INH QID (Bring this with you to 

the hospital)


Amlodipine (Norvasc), 5 MG PO DAILY


Atorvastatin (Lipitor), 10 MG PO DAILY


Finasteride (Proscar), 5 MG PO QAM


Gabapentin (Neurontin), 100 MG PO DAILY


[Ciprofloxacin 0.03%], 1 DROP OPL QID PRN for D (if needed)








- Take the following medications as scheduled the night before surgery:


Acetaminophen (Tylenol), 500 MG PO Q4 PRN for Pain (if needed)


Insulin Aspart (Novolog Penfill), INJ TIDM


Insulin Glargine (Lantus), 32 UNITS SC QPM


[Ciprofloxacin 0.03%], 1 DROP OPL QID PRN for D (if needed)


Tamsulosin Hcl (Flomax), 0.4 MG PO QPM








If you have any questions please call us at [phone] or [phone] or 

[phone]

## 2018-03-22 ENCOUNTER — HOSPITAL ENCOUNTER (OUTPATIENT)
Dept: HOSPITAL 45 - C.LABSPEC | Age: 73
Discharge: HOME | End: 2018-03-22
Attending: NURSE PRACTITIONER
Payer: COMMERCIAL

## 2018-03-22 DIAGNOSIS — N39.0: Primary | ICD-10-CM

## 2018-04-03 NOTE — CODING QUERY NO DIAGNOSIS
:  1945  TREATMENT RENDERED WITHOUT A DIAGNOSIS                                     
             



To promote full compliance with coding requirements relating to patient care, physician 
participation is requested in all cases of  uncertainty.  Please assist us with 
providing a diagnosis/symptom for the test(s) below:



A diagnosis/symptom was not documented on your Order.  A valid diagnosis/symptom is 
required to bill all insurances.



**Please remember that we are unable to code a diagnosis of rule out, probable, possible, 
questionable, or suspected.  



Tests that require a diagnosis:

DOS:  3/22/18



* UA CLEAN CATCH      DIAGNOSIS:

* URINE CULTURE CLEAN CATCH      DIAGNOSIS:













Provider Signature: _____________________________ Date: _________



Thank you  

Tori Pabon

Health Information Management

Phone:  [phone]

Fax:  [phone]



Once completed, please kindly fax back to [phone]



For questions please call [phone]

## 2018-04-04 ENCOUNTER — HOSPITAL ENCOUNTER (OUTPATIENT)
Dept: HOSPITAL 45 - X.SURG | Age: 73
Discharge: HOME | End: 2018-04-04
Attending: OPHTHALMOLOGY
Payer: COMMERCIAL

## 2018-04-04 ENCOUNTER — HOSPITAL ENCOUNTER (OUTPATIENT)
Dept: HOSPITAL 45 - C.LABSPEC | Age: 73
Discharge: HOME | End: 2018-04-04
Attending: NURSE PRACTITIONER
Payer: COMMERCIAL

## 2018-04-04 VITALS
HEIGHT: 70.98 IN | WEIGHT: 162.35 LBS | HEIGHT: 70.98 IN | WEIGHT: 162.35 LBS | BODY MASS INDEX: 22.73 KG/M2 | BODY MASS INDEX: 22.73 KG/M2

## 2018-04-04 VITALS — HEART RATE: 89 BPM | SYSTOLIC BLOOD PRESSURE: 118 MMHG | OXYGEN SATURATION: 96 % | DIASTOLIC BLOOD PRESSURE: 72 MMHG

## 2018-04-04 VITALS — TEMPERATURE: 97.34 F

## 2018-04-04 DIAGNOSIS — Z87.891: ICD-10-CM

## 2018-04-04 DIAGNOSIS — I10: ICD-10-CM

## 2018-04-04 DIAGNOSIS — E11.22: ICD-10-CM

## 2018-04-04 DIAGNOSIS — H25.12: ICD-10-CM

## 2018-04-04 DIAGNOSIS — E11.9: ICD-10-CM

## 2018-04-04 DIAGNOSIS — I12.9: Primary | ICD-10-CM

## 2018-04-04 DIAGNOSIS — N18.9: ICD-10-CM

## 2018-04-04 DIAGNOSIS — H18.51: Primary | ICD-10-CM

## 2018-04-04 LAB
BUN SERPL-MCNC: 43 MG/DL (ref 7–18)
CALCIUM SERPL-MCNC: 9.3 MG/DL (ref 8.5–10.1)
CO2 SERPL-SCNC: 24 MMOL/L (ref 21–32)
CREAT SERPL-MCNC: 1.91 MG/DL (ref 0.6–1.4)
EOSINOPHIL NFR BLD AUTO: 246 K/UL (ref 130–400)
GLUCOSE SERPL-MCNC: 69 MG/DL (ref 70–99)
HBA1C MFR BLD: 8.5 % (ref 4.5–5.6)
HCT VFR BLD CALC: 30.5 % (ref 42–52)
HGB BLD-MCNC: 9.6 G/DL (ref 14–18)
MCH RBC QN AUTO: 27.4 PG (ref 25–34)
MCHC RBC AUTO-ENTMCNC: 31.5 G/DL (ref 32–36)
MCV RBC AUTO: 86.9 FL (ref 80–100)
PMV BLD AUTO: 9.7 FL (ref 7.4–10.4)
POTASSIUM SERPL-SCNC: 4.3 MMOL/L (ref 3.5–5.1)
RED CELL DISTRIBUTION WIDTH CV: 14.7 % (ref 11.5–14.5)
RED CELL DISTRIBUTION WIDTH SD: 45.6 FL (ref 36.4–46.3)
SODIUM SERPL-SCNC: 144 MMOL/L (ref 136–145)
WBC # BLD AUTO: 9.04 K/UL (ref 4.8–10.8)

## 2018-04-04 RX ADMIN — KETOROLAC TROMETHAMINE SCH DROP: 5 SOLUTION OPHTHALMIC at 09:41

## 2018-04-04 RX ADMIN — TROPICAMIDE SCH DROP: 10 SOLUTION/ DROPS OPHTHALMIC at 09:39

## 2018-04-04 RX ADMIN — PHENYLEPHRINE HYDROCHLORIDE SCH DROP: 25 SOLUTION/ DROPS OPHTHALMIC at 09:33

## 2018-04-04 RX ADMIN — PHENYLEPHRINE HYDROCHLORIDE SCH DROP: 25 SOLUTION/ DROPS OPHTHALMIC at 09:38

## 2018-04-04 RX ADMIN — CYCLOPENTOLATE HYDROCHLORIDE SCH DROP: 10 SOLUTION/ DROPS OPHTHALMIC at 09:35

## 2018-04-04 RX ADMIN — MOXIFLOXACIN HYDROCHLORIDE SCH DROP: 5 SOLUTION/ DROPS OPHTHALMIC at 09:47

## 2018-04-04 RX ADMIN — TROPICAMIDE SCH DROP: 10 SOLUTION/ DROPS OPHTHALMIC at 09:34

## 2018-04-04 RX ADMIN — CYCLOPENTOLATE HYDROCHLORIDE SCH DROP: 10 SOLUTION/ DROPS OPHTHALMIC at 09:40

## 2018-04-04 RX ADMIN — MOXIFLOXACIN HYDROCHLORIDE SCH DROP: 5 SOLUTION/ DROPS OPHTHALMIC at 09:37

## 2018-04-04 RX ADMIN — KETOROLAC TROMETHAMINE SCH DROP: 5 SOLUTION OPHTHALMIC at 09:36

## 2018-04-04 NOTE — OPERATIVE REPORT
DATE OF OPERATION:  04/04/2018

 

PREOPERATIVE DIAGNOSES:  Dense nuclear sclerotic cataract and Fuchs corneal

dystrophy with corneal edema left eye.

 

POSTOPERATIVE DIAGNOSIS:  Same.

 

PROCEDURE PERFORMED:  Phacoemulsification cataract extraction with

intraocular lens placement and Descemet stripping automated endothelial

keratoplasty left eye.

 

SURGEON:  Dr. Gleason.  

 

COMPLICATIONS:  None.

 

ESTIMATED BLOOD LOSS:  None.

 

ANESTHESIA:  Local with sedation.

 

DESCRIPTION OF PROCEDURE:  After informed consent was obtained in the holding

area, attention was first turned to the donor cornea.  It was placed

endothelial side up on the Nazia trephine and trephinated with an 8.25 mm

Nazia trephine by myself.  It was then covered in Optisol and set aside.  The

patient was then brought back to the operating room where cardiac monitoring

leads and oxygen by nasal cannula was administered by anesthesia.  Gentle IV

sedation was given.  The patient's left eye was prepped and draped in usual

sterile fashion.  Wire lid speculum was placed in the left eye and the

operating microscope swung into position.  Using 0.12 forceps and a

supersharp blade, a paracentesis port was made at the 5 o'clock position of

patient's left eye.  1% nonpreserved lidocaine was injected into the anterior

chamber for anesthesia.  A 2.0 mm keratome blade was then used to make a

shelved clear cornea incision at the 3 o'clock position of the patient's left

eye.  The anterior chamber was then filled with Amvisc and a curvilinear

capsulorrhexis was performed with the cystotome and Utrata forceps.  BSS and

hydrodissection cannula was then used to hydrodissect the lens nucleus away

from the capsular bag.  Phacoemulsification was then begun through this dense

cataract.  Extensive phacoemulsification was done in order to remove the

cataract.  The iris was very floppy and EndoCoat was injected into the eye

numerous times to help keep the iris back.  Flomax mix was also injected into

the eye towards the beginning of phacoemulsification aid with pupillary

dilation in this floppy iris patient.  Once the cataract was removed the

irrigation-aspiration handpiece was used to remove residual epinuclear and

cortical material.  The eye was then filled with Healon.  The main incision

enlarged to 4 mm and a Bausch and Lomb MX60 22.5 Diopter intraocular lens was

injected into the capsular bag.  The previously used Nazia trephine was then

inked and used to gaetano the surface of the host cornea.  Reverse Sinskey hook

was then used to score and strip Descemet's membrane from within the marked

area.  The Descemet stripper was used to remove the Descemet's membrane from

within the eye.  The stromal  was then used to roughen the stromal

Home bed around the periphery.  The irrigation-aspiration handpiece was then

used to remove the viscoelastic material from the eye.  The donor cornea was

then placed endothelial side up on the EndoSerter and a drop of Healon was

placed on it.  It was then retracted into the EndoSerter.  The graft was then

injected into the anterior chamber using the EndoSerter.  The graft was

unfolded underneath BSS and air and a single 10-0 nylon suture was placed

through the main incision.  Complete air fill of the eye was achieved and

held for 15 minutes during which time the cornea was coated in Healon.  After

the first 15 minutes the Healon was irrigated off the cornea and a Eden

Lasik roller was used to milk the corneal interface.  The eye was recoated in

Healon and another 10 minutes elapsed after which time there was a partial

air fluid exchange done leaving behind a 75% air fill of the anterior

chamber.  ReSure sealant was placed over the paracentesis at 5 o'clock as

well as a primary incision at 3 o'clock.  The wire lid speculum was then

removed from the eye.  Vigamox and TobraDex ointment were placed on the eye

and the eye was shielded.  The patient tolerated the procedure well and was

taken to recovery area to lay flat for 2 hours prior to being discharged back

to the nursing home.

 

 

I attest to the content of the Intraoperative Record and any orders documented therein. Any exception
s are noted below.

## 2018-04-04 NOTE — ANESTHESIA PROGRESS NT - [facility]
Anesthesia Post Op Note


Date & Time


Apr 4, 2018 at 12:05





Vital Signs


Pain Intensity:  0





Vital Signs Past 12 Hours








  Date Time  Temp Pulse Resp B/P (MAP) Pulse Ox O2 Delivery O2 Flow Rate FiO2


 


4/4/18 11:45 36.3 86 16 129/76 (93) 95 Room Air  


 


4/4/18 09:27 36.5 87 20 122/73 (89) 96 Room Air  











Notes


Mental Status:  alert / awake / arousable, participated in evaluation


Pt Amnestic to Procedure:  Yes


Nausea / Vomiting:  adequately controlled


Pain:  adequately controlled


Airway Patency, RR, SpO2:  stable & adequate


BP & HR:  stable & adequate


Hydration State:  stable & adequate


Anesthetic Complications:  no major complications apparent

## 2018-04-04 NOTE — DISCHARGE INSTRUCTIONS-S[facility]
Discharge Instructions


Date of Service


Apr 4, 2018.





Visit


Reason for Visit:  Left Eye Endothelial Corneal Dystrophy, Cataract





Discharge


Discharge Diagnosis / Problem:  cataract and fuchs corneal dystrophy with 

corneal edema left eye





Discharge Goals


Goal(s):  Improve function





Activity Recommendations


Activity Limitations:  per Instructions/Follow-up section


Lifting Limitations:  no more than 5 pounds





Anesthesia


.





Post Anesthesia Instructions:





If you have had General Anesthesia or IV Sedation:





*  Do not drive today.


*  Resume driving when surgeon permits.


*  Do not make important decisions or sign legal documents today.


*  Call surgeon for:





   1.  Temperature elevations greater than 101 degrees F.


   2.  Uncontrollable pain.


   3.  Excessive bleeding.


   4.  Persistent nausea and vomiting.


   5.  Medication intolerance (nausea, vomiting or rash).





*  For nausea and vomiting use only clear liquids such as: tea, soda, bouillon 

until nausea subsides, then gradually increase diet as tolerated.





*  If you have any concerns or questions, call your surgeon's office.  If 

physician is unavailable and it is an emergency, call 911 or go to the nearest 

emergency room.





.





Instructions / Follow-Up


Instructions / Follow-Up





ACTIVITY RECOMMENDATIONS:





*  Bedrest- Eyes to the ceiling, except for meals and bathroom








MEDICATIONS:





Resume previous medications unless instructed otherwise by your surgeon.





Eye drops (today and tomorrow):


   


   Cipro - one drop in operative eye every 2 hours while awake


   Prednisolone 1% - one drop in operative eye every 2 hours while awake


   


SPECIAL CARE INSTRUCTIONS:





*  If any problems or concerns, please call Dr. Gleason's office at [phone].





*  Keep plastic shield taped over eye to sleep at night.





*  Keep plastic shield taped over eye except to administer eye drops.





*  Keep plastic shield on until office visit the following day.








FOLLOW UP VISIT:





Follow-up with Dr. Gleason in the [place] office as scheduled.





If not already scheduled, please call the office at [phone].





Diet Recommendations


Home Diet:  resume previous diet





Procedures


Procedures Performed:  


Phaco with IOL OS/ DSAEK OS





Pending Studies


Studies pending at discharge:  yes


List of pending studies:  


cornea donor rim culture





Medical Emergencies








.


Who to Call and When:





Medical Emergencies:  If at any time you feel your situation is an emergency, 

please call 911 immediately.





.





Non-Emergent Contact


Non-Emergency issues call your:  Ophthalmologist





.


.








"Provider Documentation" section prepared by Arley Gleason.








.

## 2018-04-04 NOTE — [facility] POST OPERATIVE BRIEF NOTE
Immediate Operative Summary


Operative Date


Apr 4, 2018.





Pre-Operative Diagnosis





Cataract Left Eye, Left Eye Endothelial Corneal Dystrophy





Post-Operative Diagnosis





same





Procedure(s) Performed





Phaco with IOL OS/ DSAEK OS





Surgeon


Dr. ANAMIKA Gleason





Assistant Surgeon(s)


0





Estimated Blood Loss


0





Findings


Consistent with Post-Op Diagnosis





Specimens





C&S Donor Corneal Rim





Drains


None





Anesthesia Type


MAC





Complication(s)


none





Disposition


Accompanied Pt To Recovery:  no


Disposition:  Recovery Room / PACU

## 2018-05-16 ENCOUNTER — HOSPITAL ENCOUNTER (OUTPATIENT)
Dept: HOSPITAL 45 - C.LABSPEC | Age: 73
Discharge: HOME | End: 2018-05-16
Attending: UROLOGY
Payer: COMMERCIAL

## 2018-05-16 DIAGNOSIS — R82.90: Primary | ICD-10-CM
